# Patient Record
Sex: FEMALE | Race: WHITE | NOT HISPANIC OR LATINO | Employment: STUDENT | URBAN - NONMETROPOLITAN AREA
[De-identification: names, ages, dates, MRNs, and addresses within clinical notes are randomized per-mention and may not be internally consistent; named-entity substitution may affect disease eponyms.]

---

## 2018-03-28 ENCOUNTER — HOSPITAL ENCOUNTER (EMERGENCY)
Facility: HOSPITAL | Age: 23
End: 2018-03-29
Attending: EMERGENCY MEDICINE | Admitting: EMERGENCY MEDICINE
Payer: COMMERCIAL

## 2018-03-28 ENCOUNTER — APPOINTMENT (EMERGENCY)
Dept: CT IMAGING | Facility: HOSPITAL | Age: 23
End: 2018-03-28
Payer: COMMERCIAL

## 2018-03-28 DIAGNOSIS — A41.9 SEPSIS DUE TO URINARY TRACT INFECTION (HCC): ICD-10-CM

## 2018-03-28 DIAGNOSIS — N20.1 URETEROLITHIASIS: ICD-10-CM

## 2018-03-28 DIAGNOSIS — N12 PYELONEPHRITIS: Primary | ICD-10-CM

## 2018-03-28 DIAGNOSIS — N39.0 SEPSIS DUE TO URINARY TRACT INFECTION (HCC): ICD-10-CM

## 2018-03-28 DIAGNOSIS — N17.9 ACUTE KIDNEY INJURY (HCC): ICD-10-CM

## 2018-03-28 LAB
ALBUMIN SERPL BCP-MCNC: 3.6 G/DL (ref 3.5–5)
ALP SERPL-CCNC: 65 U/L (ref 46–116)
ALT SERPL W P-5'-P-CCNC: 23 U/L (ref 12–78)
ANION GAP SERPL CALCULATED.3IONS-SCNC: 14 MMOL/L (ref 4–13)
APTT PPP: 37 SECONDS (ref 23–35)
AST SERPL W P-5'-P-CCNC: 16 U/L (ref 5–45)
BACTERIA UR QL AUTO: ABNORMAL /HPF
BASOPHILS # BLD MANUAL: 0 THOUSAND/UL (ref 0–0.1)
BASOPHILS NFR MAR MANUAL: 0 % (ref 0–1)
BILIRUB SERPL-MCNC: 0.6 MG/DL (ref 0.2–1)
BILIRUB UR QL STRIP: NEGATIVE
BUN SERPL-MCNC: 13 MG/DL (ref 5–25)
CALCIUM SERPL-MCNC: 8.8 MG/DL (ref 8.3–10.1)
CHLORIDE SERPL-SCNC: 98 MMOL/L (ref 100–108)
CLARITY UR: ABNORMAL
CO2 SERPL-SCNC: 20 MMOL/L (ref 21–32)
COLOR UR: YELLOW
CREAT SERPL-MCNC: 1.43 MG/DL (ref 0.6–1.3)
EOSINOPHIL # BLD MANUAL: 0 THOUSAND/UL (ref 0–0.4)
EOSINOPHIL NFR BLD MANUAL: 0 % (ref 0–6)
ERYTHROCYTE [DISTWIDTH] IN BLOOD BY AUTOMATED COUNT: 12.5 % (ref 11.6–15.1)
EXT PREG TEST URINE: NEGATIVE
GFR SERPL CREATININE-BSD FRML MDRD: 53 ML/MIN/1.73SQ M
GLUCOSE SERPL-MCNC: 147 MG/DL (ref 65–140)
GLUCOSE UR STRIP-MCNC: NEGATIVE MG/DL
HCT VFR BLD AUTO: 35.6 % (ref 34.8–46.1)
HGB BLD-MCNC: 12.3 G/DL (ref 11.5–15.4)
HGB UR QL STRIP.AUTO: ABNORMAL
INR PPP: 1.27 (ref 0.86–1.16)
KETONES UR STRIP-MCNC: ABNORMAL MG/DL
LACTATE SERPL-SCNC: 2.2 MMOL/L (ref 0.5–2)
LEUKOCYTE ESTERASE UR QL STRIP: ABNORMAL
LYMPHOCYTES # BLD AUTO: 0.7 THOUSAND/UL (ref 0.6–4.47)
LYMPHOCYTES # BLD AUTO: 4 % (ref 14–44)
MCH RBC QN AUTO: 30.4 PG (ref 26.8–34.3)
MCHC RBC AUTO-ENTMCNC: 34.6 G/DL (ref 31.4–37.4)
MCV RBC AUTO: 88 FL (ref 82–98)
MONOCYTES # BLD AUTO: 1.23 THOUSAND/UL (ref 0–1.22)
MONOCYTES NFR BLD: 7 % (ref 4–12)
NEUTROPHILS # BLD MANUAL: 15.58 THOUSAND/UL (ref 1.85–7.62)
NEUTS BAND NFR BLD MANUAL: 2 % (ref 0–8)
NEUTS SEG NFR BLD AUTO: 87 % (ref 43–75)
NITRITE UR QL STRIP: NEGATIVE
NON-SQ EPI CELLS URNS QL MICRO: ABNORMAL /HPF
PH UR STRIP.AUTO: 6 [PH] (ref 4.5–8)
PLATELET # BLD AUTO: 211 THOUSANDS/UL (ref 149–390)
PLATELET BLD QL SMEAR: ADEQUATE
PMV BLD AUTO: 9.7 FL (ref 8.9–12.7)
POTASSIUM SERPL-SCNC: 3.6 MMOL/L (ref 3.5–5.3)
PROT SERPL-MCNC: 7.7 G/DL (ref 6.4–8.2)
PROT UR STRIP-MCNC: ABNORMAL MG/DL
PROTHROMBIN TIME: 15.8 SECONDS (ref 12.1–14.4)
RBC # BLD AUTO: 4.04 MILLION/UL (ref 3.81–5.12)
RBC #/AREA URNS AUTO: ABNORMAL /HPF
SODIUM SERPL-SCNC: 132 MMOL/L (ref 136–145)
SP GR UR STRIP.AUTO: 1.02 (ref 1–1.03)
TOTAL CELLS COUNTED SPEC: 100
UROBILINOGEN UR QL STRIP.AUTO: 0.2 E.U./DL
WBC # BLD AUTO: 17.51 THOUSAND/UL (ref 4.31–10.16)
WBC #/AREA URNS AUTO: ABNORMAL /HPF

## 2018-03-28 PROCEDURE — 80053 COMPREHEN METABOLIC PANEL: CPT | Performed by: EMERGENCY MEDICINE

## 2018-03-28 PROCEDURE — 85007 BL SMEAR W/DIFF WBC COUNT: CPT | Performed by: EMERGENCY MEDICINE

## 2018-03-28 PROCEDURE — 81001 URINALYSIS AUTO W/SCOPE: CPT | Performed by: EMERGENCY MEDICINE

## 2018-03-28 PROCEDURE — 85730 THROMBOPLASTIN TIME PARTIAL: CPT | Performed by: EMERGENCY MEDICINE

## 2018-03-28 PROCEDURE — 83605 ASSAY OF LACTIC ACID: CPT | Performed by: EMERGENCY MEDICINE

## 2018-03-28 PROCEDURE — 84703 CHORIONIC GONADOTROPIN ASSAY: CPT | Performed by: EMERGENCY MEDICINE

## 2018-03-28 PROCEDURE — 87040 BLOOD CULTURE FOR BACTERIA: CPT | Performed by: EMERGENCY MEDICINE

## 2018-03-28 PROCEDURE — 81025 URINE PREGNANCY TEST: CPT | Performed by: EMERGENCY MEDICINE

## 2018-03-28 PROCEDURE — 85610 PROTHROMBIN TIME: CPT | Performed by: EMERGENCY MEDICINE

## 2018-03-28 PROCEDURE — 87186 SC STD MICRODIL/AGAR DIL: CPT | Performed by: EMERGENCY MEDICINE

## 2018-03-28 PROCEDURE — 87086 URINE CULTURE/COLONY COUNT: CPT | Performed by: EMERGENCY MEDICINE

## 2018-03-28 PROCEDURE — 87077 CULTURE AEROBIC IDENTIFY: CPT | Performed by: EMERGENCY MEDICINE

## 2018-03-28 PROCEDURE — 36415 COLL VENOUS BLD VENIPUNCTURE: CPT

## 2018-03-28 PROCEDURE — 85027 COMPLETE CBC AUTOMATED: CPT | Performed by: EMERGENCY MEDICINE

## 2018-03-28 PROCEDURE — 74177 CT ABD & PELVIS W/CONTRAST: CPT

## 2018-03-28 PROCEDURE — 96375 TX/PRO/DX INJ NEW DRUG ADDON: CPT

## 2018-03-28 PROCEDURE — 96361 HYDRATE IV INFUSION ADD-ON: CPT

## 2018-03-28 RX ORDER — ACETAMINOPHEN 325 MG/1
975 TABLET ORAL ONCE
Status: COMPLETED | OUTPATIENT
Start: 2018-03-28 | End: 2018-03-28

## 2018-03-28 RX ORDER — ONDANSETRON 2 MG/ML
4 INJECTION INTRAMUSCULAR; INTRAVENOUS ONCE
Status: COMPLETED | OUTPATIENT
Start: 2018-03-28 | End: 2018-03-28

## 2018-03-28 RX ADMIN — SODIUM CHLORIDE 1000 ML: 0.9 INJECTION, SOLUTION INTRAVENOUS at 22:26

## 2018-03-28 RX ADMIN — ACETAMINOPHEN 975 MG: 325 TABLET, FILM COATED ORAL at 23:07

## 2018-03-28 RX ADMIN — ONDANSETRON 4 MG: 2 INJECTION INTRAMUSCULAR; INTRAVENOUS at 23:07

## 2018-03-28 RX ADMIN — SODIUM CHLORIDE 1000 ML: 0.9 INJECTION, SOLUTION INTRAVENOUS at 23:07

## 2018-03-28 RX ADMIN — IOHEXOL 100 ML: 350 INJECTION, SOLUTION INTRAVENOUS at 23:52

## 2018-03-29 ENCOUNTER — ANESTHESIA EVENT (INPATIENT)
Dept: PERIOP | Facility: HOSPITAL | Age: 23
DRG: 872 | End: 2018-03-29
Payer: COMMERCIAL

## 2018-03-29 ENCOUNTER — HOSPITAL ENCOUNTER (INPATIENT)
Facility: HOSPITAL | Age: 23
LOS: 3 days | Discharge: HOME | DRG: 872 | End: 2018-04-01
Attending: INTERNAL MEDICINE | Admitting: INTERNAL MEDICINE
Payer: COMMERCIAL

## 2018-03-29 ENCOUNTER — APPOINTMENT (INPATIENT)
Dept: RADIOLOGY | Facility: HOSPITAL | Age: 23
DRG: 872 | End: 2018-03-29
Payer: COMMERCIAL

## 2018-03-29 ENCOUNTER — ANESTHESIA (INPATIENT)
Dept: PERIOP | Facility: HOSPITAL | Age: 23
DRG: 872 | End: 2018-03-29
Payer: COMMERCIAL

## 2018-03-29 VITALS
DIASTOLIC BLOOD PRESSURE: 61 MMHG | TEMPERATURE: 100 F | HEART RATE: 94 BPM | WEIGHT: 150 LBS | HEIGHT: 67 IN | RESPIRATION RATE: 19 BRPM | OXYGEN SATURATION: 100 % | SYSTOLIC BLOOD PRESSURE: 114 MMHG | BODY MASS INDEX: 23.54 KG/M2

## 2018-03-29 DIAGNOSIS — N10 ACUTE PYELONEPHRITIS: ICD-10-CM

## 2018-03-29 DIAGNOSIS — N20.1 URETERAL STONE: ICD-10-CM

## 2018-03-29 DIAGNOSIS — N12 PYELONEPHRITIS: Primary | ICD-10-CM

## 2018-03-29 PROBLEM — A41.9 SEPSIS (HCC): Status: ACTIVE | Noted: 2018-03-29

## 2018-03-29 LAB
ANION GAP SERPL CALCULATED.3IONS-SCNC: 13 MMOL/L (ref 4–13)
BUN SERPL-MCNC: 13 MG/DL (ref 5–25)
CALCIUM SERPL-MCNC: 8 MG/DL (ref 8.3–10.1)
CHLORIDE SERPL-SCNC: 105 MMOL/L (ref 100–108)
CO2 SERPL-SCNC: 19 MMOL/L (ref 21–32)
CREAT SERPL-MCNC: 1.32 MG/DL (ref 0.6–1.3)
ERYTHROCYTE [DISTWIDTH] IN BLOOD BY AUTOMATED COUNT: 13 % (ref 11.6–15.1)
GFR SERPL CREATININE-BSD FRML MDRD: 58 ML/MIN/1.73SQ M
GLUCOSE SERPL-MCNC: 116 MG/DL (ref 65–140)
HCG SERPL QL: NEGATIVE
HCG SERPL QL: NEGATIVE
HCT VFR BLD AUTO: 30.5 % (ref 34.8–46.1)
HGB BLD-MCNC: 10.4 G/DL (ref 11.5–15.4)
HOLD SPECIMEN: NORMAL
LACTATE SERPL-SCNC: 1.3 MMOL/L (ref 0.5–2)
MCH RBC QN AUTO: 30 PG (ref 26.8–34.3)
MCHC RBC AUTO-ENTMCNC: 34.1 G/DL (ref 31.4–37.4)
MCV RBC AUTO: 88 FL (ref 82–98)
PLATELET # BLD AUTO: 176 THOUSANDS/UL (ref 149–390)
PMV BLD AUTO: 10 FL (ref 8.9–12.7)
POTASSIUM SERPL-SCNC: 3.5 MMOL/L (ref 3.5–5.3)
RBC # BLD AUTO: 3.47 MILLION/UL (ref 3.81–5.12)
SODIUM SERPL-SCNC: 137 MMOL/L (ref 136–145)
WBC # BLD AUTO: 12.2 THOUSAND/UL (ref 4.31–10.16)

## 2018-03-29 PROCEDURE — 99285 EMERGENCY DEPT VISIT HI MDM: CPT

## 2018-03-29 PROCEDURE — C2625 STENT, NON-COR, TEM W/DEL SY: HCPCS | Performed by: UROLOGY

## 2018-03-29 PROCEDURE — 99223 1ST HOSP IP/OBS HIGH 75: CPT | Performed by: INTERNAL MEDICINE

## 2018-03-29 PROCEDURE — 74450 X-RAY URETHRA/BLADDER: CPT

## 2018-03-29 PROCEDURE — 87040 BLOOD CULTURE FOR BACTERIA: CPT | Performed by: INTERNAL MEDICINE

## 2018-03-29 PROCEDURE — 85027 COMPLETE CBC AUTOMATED: CPT | Performed by: INTERNAL MEDICINE

## 2018-03-29 PROCEDURE — 96365 THER/PROPH/DIAG IV INF INIT: CPT

## 2018-03-29 PROCEDURE — 36415 COLL VENOUS BLD VENIPUNCTURE: CPT | Performed by: EMERGENCY MEDICINE

## 2018-03-29 PROCEDURE — 80048 BASIC METABOLIC PNL TOTAL CA: CPT | Performed by: INTERNAL MEDICINE

## 2018-03-29 PROCEDURE — 83605 ASSAY OF LACTIC ACID: CPT | Performed by: EMERGENCY MEDICINE

## 2018-03-29 PROCEDURE — 96361 HYDRATE IV INFUSION ADD-ON: CPT

## 2018-03-29 PROCEDURE — BT1FYZZ FLUOROSCOPY OF LEFT KIDNEY, URETER AND BLADDER USING OTHER CONTRAST: ICD-10-PCS | Performed by: UROLOGY

## 2018-03-29 PROCEDURE — 0T778DZ DILATION OF LEFT URETER WITH INTRALUMINAL DEVICE, VIA NATURAL OR ARTIFICIAL OPENING ENDOSCOPIC: ICD-10-PCS | Performed by: UROLOGY

## 2018-03-29 PROCEDURE — 96375 TX/PRO/DX INJ NEW DRUG ADDON: CPT

## 2018-03-29 PROCEDURE — 99254 IP/OBS CNSLTJ NEW/EST MOD 60: CPT | Performed by: INTERNAL MEDICINE

## 2018-03-29 PROCEDURE — 52332 CYSTOSCOPY AND TREATMENT: CPT | Performed by: UROLOGY

## 2018-03-29 PROCEDURE — 84703 CHORIONIC GONADOTROPIN ASSAY: CPT | Performed by: INTERNAL MEDICINE

## 2018-03-29 PROCEDURE — 99254 IP/OBS CNSLTJ NEW/EST MOD 60: CPT | Performed by: PHYSICIAN ASSISTANT

## 2018-03-29 DEVICE — STENT KWART RETRO INJECT SET 6FR 145CM: Type: IMPLANTABLE DEVICE | Site: URETER | Status: FUNCTIONAL

## 2018-03-29 RX ORDER — OXYCODONE HYDROCHLORIDE AND ACETAMINOPHEN 5; 325 MG/1; MG/1
1 TABLET ORAL EVERY 4 HOURS PRN
Status: DISCONTINUED | OUTPATIENT
Start: 2018-03-29 | End: 2018-04-01 | Stop reason: HOSPADM

## 2018-03-29 RX ORDER — SODIUM CHLORIDE, SODIUM LACTATE, POTASSIUM CHLORIDE, CALCIUM CHLORIDE 600; 310; 30; 20 MG/100ML; MG/100ML; MG/100ML; MG/100ML
INJECTION, SOLUTION INTRAVENOUS CONTINUOUS PRN
Status: DISCONTINUED | OUTPATIENT
Start: 2018-03-29 | End: 2018-03-29 | Stop reason: SURG

## 2018-03-29 RX ORDER — PROPOFOL 10 MG/ML
INJECTION, EMULSION INTRAVENOUS AS NEEDED
Status: DISCONTINUED | OUTPATIENT
Start: 2018-03-29 | End: 2018-03-29 | Stop reason: SURG

## 2018-03-29 RX ORDER — POLYETHYLENE GLYCOL 3350 17 G/17G
17 POWDER, FOR SOLUTION ORAL DAILY
Status: DISCONTINUED | OUTPATIENT
Start: 2018-03-29 | End: 2018-03-29

## 2018-03-29 RX ORDER — ONDANSETRON 2 MG/ML
4 INJECTION INTRAMUSCULAR; INTRAVENOUS EVERY 4 HOURS PRN
Status: DISCONTINUED | OUTPATIENT
Start: 2018-03-29 | End: 2018-04-01 | Stop reason: HOSPADM

## 2018-03-29 RX ORDER — ACETAMINOPHEN 325 MG/1
650 TABLET ORAL EVERY 6 HOURS PRN
Status: DISCONTINUED | OUTPATIENT
Start: 2018-03-29 | End: 2018-03-29

## 2018-03-29 RX ORDER — ZOLPIDEM TARTRATE 5 MG/1
5 TABLET ORAL
Status: DISCONTINUED | OUTPATIENT
Start: 2018-03-29 | End: 2018-04-01 | Stop reason: HOSPADM

## 2018-03-29 RX ORDER — FENTANYL CITRATE 50 UG/ML
INJECTION, SOLUTION INTRAMUSCULAR; INTRAVENOUS AS NEEDED
Status: DISCONTINUED | OUTPATIENT
Start: 2018-03-29 | End: 2018-03-29 | Stop reason: SURG

## 2018-03-29 RX ORDER — MAGNESIUM HYDROXIDE 1200 MG/15ML
LIQUID ORAL AS NEEDED
Status: DISCONTINUED | OUTPATIENT
Start: 2018-03-29 | End: 2018-03-29 | Stop reason: HOSPADM

## 2018-03-29 RX ORDER — MIDAZOLAM HYDROCHLORIDE 1 MG/ML
INJECTION INTRAMUSCULAR; INTRAVENOUS AS NEEDED
Status: DISCONTINUED | OUTPATIENT
Start: 2018-03-29 | End: 2018-03-29 | Stop reason: SURG

## 2018-03-29 RX ORDER — FENTANYL CITRATE/PF 50 MCG/ML
25 SYRINGE (ML) INJECTION
Status: DISCONTINUED | OUTPATIENT
Start: 2018-03-29 | End: 2018-03-29 | Stop reason: HOSPADM

## 2018-03-29 RX ORDER — FENTANYL CITRATE 50 UG/ML
50 INJECTION, SOLUTION INTRAMUSCULAR; INTRAVENOUS ONCE
Status: COMPLETED | OUTPATIENT
Start: 2018-03-29 | End: 2018-03-29

## 2018-03-29 RX ORDER — SODIUM CHLORIDE 9 MG/ML
INJECTION, SOLUTION INTRAVENOUS CONTINUOUS PRN
Status: DISCONTINUED | OUTPATIENT
Start: 2018-03-29 | End: 2018-03-29 | Stop reason: SURG

## 2018-03-29 RX ORDER — ONDANSETRON 2 MG/ML
4 INJECTION INTRAMUSCULAR; INTRAVENOUS ONCE
Status: DISCONTINUED | OUTPATIENT
Start: 2018-03-29 | End: 2018-03-29 | Stop reason: HOSPADM

## 2018-03-29 RX ORDER — HEPARIN SODIUM 5000 [USP'U]/ML
5000 INJECTION, SOLUTION INTRAVENOUS; SUBCUTANEOUS EVERY 8 HOURS SCHEDULED
Status: DISCONTINUED | OUTPATIENT
Start: 2018-03-29 | End: 2018-04-01 | Stop reason: HOSPADM

## 2018-03-29 RX ORDER — SODIUM CHLORIDE 9 MG/ML
125 INJECTION, SOLUTION INTRAVENOUS CONTINUOUS
Status: DISCONTINUED | OUTPATIENT
Start: 2018-03-29 | End: 2018-03-30

## 2018-03-29 RX ORDER — LIDOCAINE HYDROCHLORIDE 10 MG/ML
INJECTION, SOLUTION INFILTRATION; PERINEURAL AS NEEDED
Status: DISCONTINUED | OUTPATIENT
Start: 2018-03-29 | End: 2018-03-29 | Stop reason: SURG

## 2018-03-29 RX ORDER — ACETAMINOPHEN 325 MG/1
650 TABLET ORAL EVERY 4 HOURS PRN
Status: DISCONTINUED | OUTPATIENT
Start: 2018-03-29 | End: 2018-04-01 | Stop reason: HOSPADM

## 2018-03-29 RX ADMIN — CEFTRIAXONE 1000 MG: 1 INJECTION, SOLUTION INTRAVENOUS at 05:57

## 2018-03-29 RX ADMIN — MIDAZOLAM HYDROCHLORIDE 2 MG: 1 INJECTION, SOLUTION INTRAMUSCULAR; INTRAVENOUS at 11:18

## 2018-03-29 RX ADMIN — SODIUM CHLORIDE, SODIUM LACTATE, POTASSIUM CHLORIDE, AND CALCIUM CHLORIDE: .6; .31; .03; .02 INJECTION, SOLUTION INTRAVENOUS at 12:04

## 2018-03-29 RX ADMIN — ONDANSETRON HYDROCHLORIDE 8 MG: 2 INJECTION, SOLUTION INTRAVENOUS at 11:33

## 2018-03-29 RX ADMIN — FENTANYL CITRATE 25 MCG: 50 INJECTION, SOLUTION INTRAMUSCULAR; INTRAVENOUS at 13:53

## 2018-03-29 RX ADMIN — HEPARIN SODIUM 5000 UNITS: 5000 INJECTION, SOLUTION INTRAVENOUS; SUBCUTANEOUS at 21:43

## 2018-03-29 RX ADMIN — PROPOFOL 200 MG: 10 INJECTION, EMULSION INTRAVENOUS at 11:23

## 2018-03-29 RX ADMIN — PIPERACILLIN SODIUM AND TAZOBACTAM SODIUM: 4; .5 INJECTION, POWDER, FOR SOLUTION INTRAVENOUS at 00:02

## 2018-03-29 RX ADMIN — SODIUM CHLORIDE: 0.9 INJECTION, SOLUTION INTRAVENOUS at 11:14

## 2018-03-29 RX ADMIN — HYDROMORPHONE HYDROCHLORIDE 1 MG: 1 INJECTION, SOLUTION INTRAMUSCULAR; INTRAVENOUS; SUBCUTANEOUS at 04:57

## 2018-03-29 RX ADMIN — SODIUM CHLORIDE 125 ML/HR: 0.9 INJECTION, SOLUTION INTRAVENOUS at 05:03

## 2018-03-29 RX ADMIN — FENTANYL CITRATE 50 MCG: 50 INJECTION INTRAMUSCULAR; INTRAVENOUS at 01:47

## 2018-03-29 RX ADMIN — SODIUM CHLORIDE 500 ML: 0.9 INJECTION, SOLUTION INTRAVENOUS at 02:33

## 2018-03-29 RX ADMIN — DEXAMETHASONE SODIUM PHOSPHATE 8 MG: 10 INJECTION INTRAMUSCULAR; INTRAVENOUS at 11:33

## 2018-03-29 RX ADMIN — SODIUM CHLORIDE 125 ML/HR: 0.9 INJECTION, SOLUTION INTRAVENOUS at 13:31

## 2018-03-29 RX ADMIN — FENTANYL CITRATE 50 MCG: 50 INJECTION, SOLUTION INTRAMUSCULAR; INTRAVENOUS at 11:37

## 2018-03-29 RX ADMIN — LIDOCAINE HYDROCHLORIDE 60 MG: 10 INJECTION, SOLUTION INFILTRATION; PERINEURAL at 11:23

## 2018-03-29 RX ADMIN — OXYCODONE HYDROCHLORIDE AND ACETAMINOPHEN 1 TABLET: 5; 325 TABLET ORAL at 15:23

## 2018-03-29 RX ADMIN — ACETAMINOPHEN 650 MG: 325 TABLET, FILM COATED ORAL at 05:00

## 2018-03-29 RX ADMIN — FENTANYL CITRATE 100 MCG: 50 INJECTION, SOLUTION INTRAMUSCULAR; INTRAVENOUS at 11:23

## 2018-03-29 RX ADMIN — SODIUM CHLORIDE, SODIUM LACTATE, POTASSIUM CHLORIDE, AND CALCIUM CHLORIDE: .6; .31; .03; .02 INJECTION, SOLUTION INTRAVENOUS at 11:34

## 2018-03-29 RX ADMIN — OXYCODONE HYDROCHLORIDE AND ACETAMINOPHEN 1 TABLET: 5; 325 TABLET ORAL at 20:33

## 2018-03-29 RX ADMIN — FENTANYL CITRATE 50 MCG: 50 INJECTION, SOLUTION INTRAMUSCULAR; INTRAVENOUS at 11:45

## 2018-03-29 NOTE — SOCIAL WORK
CM met with patient at bedside to address discharge needs, however, patient was ALIVIA at [address] pool  Patients parents were present and answered CMs opening questions  Patient lives at college in [place] right now, but when she is not at college she lives with her parents  Patients bedroom is on the 3rd floor  Patient has no difficulty with steps  Patient is independent with ADLs and functional mobility  Support system is her parents  Food shopping & meal prep is done by her mother  Patient does not use any DMEs  Patient is able to ambulate without  assistance  No hx of VNA reported  Patient is not currently employed as she is a full-time student in [place]  PCP identified as  Dr Jez Alexis  No POA identified, but parents would be patients healthcare representative, spokesperson, and designated caregiver if needed  Patient uses [facility] or [facility] for Rx needs; patients parents made aware of [address] to use at discharge if needed  Patient does drive; parents will be transporting  home upon discharge  Help/support reported  Patient made aware of CM's name, number and role  CM to follow as case progresses and needs are determined

## 2018-03-29 NOTE — CONSULTS
Consultation - Infectious Disease   Evette Davis 23 y o  female MRN: [id number]  Unit/Bed#: MATTHIEU LOYA Encounter: [id number]      IMPRESSION & RECOMMENDATIONS:   Impression/Recommendations: This is a 23 y o  female, otherwise healthy, presented with fever and left flank pain  Patient was found to have sepsis and an obstructing left ureteral stone  She is scheduled to undergo left ureteral stent placement  1   Sepsis, POA, presented with fever, leukocytosis and tachycardia  Source of sepsis is most likely UTI  Given lack of hospitalization antibiotic use, patient has low risk for resistant pathogens  She has been started on IV ceftriaxone  This is reasonable  Despite sepsis, patient is systemically well, without hypotension of toxicity  Antibiotic plan as in below  Monitor temperature/WBC  Monitor hemodynamics  Follow-up on pending blood cultures  2   Obstructive left pyelonephritis, secondary to left ureteral stone  Patient is clinically stable  She is to undergo left ureteral stent placement later today  Continue IV ceftriaxone  Follow-up on blood and urine cultures and adjust antibiotic accordingly  Plan for left ureteral stent placement later today noted  3   Nephrolithiasis  This is unknown to patient  Urology evaluation noted  She will eventually need stone extraction, once pyelonephritis and sepsis resolved  Urology follow-up for stone extraction after pyelonephritis and sepsis resolve  4   REHAN, POA  This is probably a combination of sepsis and left ureteral obstruction  Creatinine is improved overnight  Ceftriaxone at full dose  Monitor creatinine  5   Multi loculated left ovarian cyst   This has incidental finding  Symptoms above are due to obstructive left pyelonephritis and not the versus  This can be worked up electively  [facility] ER note reviewed in detail  Discussed with patient in detail regarding the above plan      Thank you for this consultation  We will follow along with you  HISTORY OF PRESENT ILLNESS:  Reason for Consult:  Sepsis  Pyelonephritis  HPI: Mirlande Perez is a 23 y o  female, otherwise healthy, presented to [address] [place] [address] ER last night with fever and left flank pain  On presentation, patient had fever, leukocytosis and tachycardia  Abdomen/pelvis CT showed an obstructing left ureteral stone  IV ceftriaxone was started  Patient was transferred here for Urology/IR evaluation  She is scheduled to undergo placement of left ureteral stent later today  For all these reasons, we are asked to evaluate the patient  Patient denies any prior UTIs  She denies history of nephrolithiasis  No family history of nephrolithiasis  Patient states that she started having left flank pain 3 days prior to presentation  Pain slowly became worse, with radiation to left hip/pelvis  In addition, she started having fever/chills with nausea/vomiting  No diarrhea  No recent antibiotic  No urinary symptoms  REVIEW OF SYSTEMS:  A complete 12 point system-based review of systems is otherwise negative  PAST MEDICAL HISTORY:  History reviewed  No pertinent past medical history  History reviewed  No pertinent surgical history  Problem list reviewed  FAMILY HISTORY:  Non-contributory    SOCIAL HISTORY:  History   Alcohol Use    Yes     Comment: occasionally     History   Drug Use No     History   Smoking Status    Never Smoker   Smokeless Tobacco    Never Used       ALLERGIES:  Allergies   Allergen Reactions    Shellfish-Derived Products Hives       MEDICATIONS:  All current active medications have been reviewed  Patient is currently on IV ceftriaxone      PHYSICAL EXAM:  Vitals:  HR:  [] 106  Resp:  [12-19] 18  BP: (114-140)/(61-86) 132/63  SpO2:  [98 %-100 %] 98 %  Temp (24hrs), Av 2 °F (38 4 °C), Min:100 °F (37 8 °C), Max:102 5 °F (39 2 °C)  Current: Temperature: 100 °F (37 8 °C)     Physical Exam:  General: Well-nourished, well-developed, in no acute distress  Awake, alert and oriented x 3  Eyes:  Conjunctive clear with no hemorrhages or effusions  Oropharynx:  No ulcers, no lesions, pharynx benign, no tonsillitis  Neck:  Supple, no lymphadenopathy, no mass, nontender  Lungs:  Expansion symmetric, no rales, no wheezing, no accessory muscle use  Cardiac:  Tachycardic with regular rhythm, normal S1, normal S2, no murmurs  Abdomen:  Soft, nondistended, moderate left flank and CVA tenderness, no HSM  Extremities:  No edema, no erythema, nontender  No ulcers  Skin:  No rashes, no ulcers  Neurological:  Moves all four extremities spontaneously, sensation grossly intact    LABS, IMAGING, & OTHER STUDIES:  Lab Results:  I have personally reviewed pertinent labs  Results from last 7 days  Lab Units 03/29/18  0550 03/28/18  2225   SODIUM mmol/L 137 132*   POTASSIUM mmol/L 3 5 3 6   CHLORIDE mmol/L 105 98*   CO2 mmol/L 19* 20*   ANION GAP mmol/L 13 14*   BUN mg/dL 13 13   CREATININE mg/dL 1 32* 1 43*   EGFR ml/min/1 73sq m 58 53   GLUCOSE RANDOM mg/dL 116 147*   CALCIUM mg/dL 8 0* 8 8   AST U/L  --  16   ALT U/L  --  23   ALK PHOS U/L  --  65   TOTAL PROTEIN g/dL  --  7 7   BILIRUBIN TOTAL mg/dL  --  0 60       Results from last 7 days  Lab Units 03/29/18  0550 03/28/18  2225   WBC Thousand/uL 12 20* 17 51*   HEMOGLOBIN g/dL 10 4* 12 3   PLATELETS Thousands/uL 176 211           Imaging Studies:   I have personally reviewed pertinent imaging study reports and images in PACS  Abdomen/pelvis CT reviewed personally  There is an obstructing left ureteral stone with left hydronephrosis and hydroureter  There are also other nonobstructing left-sided and right-sided stones  There is a multiloculated left ovarian cyst     EKG, Pathology, and Other Studies:   I have personally reviewed pertinent reports

## 2018-03-29 NOTE — TREATMENT PLAN
Reviewed Chart  Pt admitted by MD this am  Pt is on antibiotics  Scheduled for a stent now and not nephrostomy tube  Agree with plan except would add REHAN  Would limit nephrotoxic medications  Continue IVF   Check bmp in am

## 2018-03-29 NOTE — EMTALA/ACUTE CARE TRANSFER
[address] [facility] [address]  [address] [facility] [address]  Dept: [phone]      EMTALA TRANSFER CONSENT    NAME Anitra Martin                                         1995                              MRN [id number]    I have been informed of my rights regarding examination, treatment, and transfer   by Dr Myriam Wilson DO    Benefits: Specialized equipment and/or services available at the receiving facility (Include comment)________________________ (IR)    Risks: Potential for delay in receiving treatment, Other: (Include comment)__________________________, Potential deterioration of medical condition, Loss of IV, Increased discomfort during transfer, Possible worsening of condition or death during transfer (motor vehicle collision)      Consent for Transfer:  I acknowledge that my medical condition has been evaluated and explained to me by the emergency department physician or other qualified medical person and/or my attending physician, who has recommended that I be transferred to the service of  Accepting Physician: Dr Ernesto Bourne at [address] [place] [address] Name, [facility] : [address] [place] [address]  The above potential benefits of such transfer, the potential risks associated with such transfer, and the probable risks of not being transferred have been explained to me, and I fully understand them  The doctor has explained that, in my case, the benefits of transfer outweigh the risks  I agree to be transferred  I authorize the performance of emergency medical procedures and treatments upon me in both transit and upon arrival at the receiving facility  Additionally, I authorize the release of any and all medical records to the receiving facility and request they be transported with me, if possible  I understand that the safest mode of transportation during a medical emergency is an ambulance and that the Hospital advocates the use of this mode of transport   Risks of traveling to the receiving facility by car, including absence of medical control, life sustaining equipment, such as oxygen, and medical personnel has been explained to me and I fully understand them  (MANINDER CORRECT BOX BELOW)  [  ]  I consent to the stated transfer and to be transported by ambulance/helicopter  [  ]  I consent to the stated transfer, but refuse transportation by ambulance and accept full responsibility for my transportation by car  I understand the risks of non-ambulance transfers and I exonerate the Hospital and its staff from any deterioration in my condition that results from this refusal     X___________________________________________    DATE  18  TIME________  Signature of patient or legally responsible individual signing on patient behalf           RELATIONSHIP TO PATIENT_________________________          Provider Certification    NAME Matteo Ferrer                                         1995                              MRN [id number]    A medical screening exam was performed on the above named patient  Based on the examination:    Condition Necessitating Transfer The primary encounter diagnosis was Pyelonephritis  Diagnoses of Sepsis due to [facility] tract infection ([facility] ), Ureterolithiasis, and Acute kidney injury [facility]) were also pertinent to this visit      Patient Condition: The patient has been stabilized such that within reasonable medical probability, no material deterioration of the patient condition or the condition of the unborn child(iveth) is likely to result from the transfer    Reason for Transfer: Level of Care needed not available at this facility (IR)    Transfer Requirements: [address]   · Space available and qualified personnel available for treatment as acknowledged by    · Agreed to accept transfer and to provide appropriate medical treatment as acknowledged by       Dr Felicia Marlow  · Appropriate medical records of the examination and treatment of the patient are provided at the time of transfer   [address] [facility] _______  · Transfer will be performed by qualified personnel from    and appropriate transfer equipment as required, including the use of necessary and appropriate life support measures  Provider Certification: I have examined the patient and explained the following risks and benefits of being transferred/refusing transfer to the patient/family:  General risk, such as traffic hazards, adverse weather conditions, rough terrain or turbulence, possible failure of equipment (including vehicle or aircraft), or consequences of actions of persons outside the control of the transport personnel      Based on these reasonable risks and benefits to the patient and/or the unborn child(iveth), and based upon the information available at the time of the patients examination, I certify that the medical benefits reasonably to be expected from the provision of appropriate medical treatments at another medical facility outweigh the increasing risks, if any, to the individuals medical condition, and in the case of labor to the unborn child, from effecting the transfer      X____________________________________________ DATE 03/29/18        TIME_______      ORIGINAL - SEND TO MEDICAL RECORDS   COPY - SEND WITH PATIENT DURING TRANSFER

## 2018-03-29 NOTE — ANESTHESIA POSTPROCEDURE EVALUATION
Post-Op Assessment Note      CV Status:  Stable    Mental Status:  Alert and awake    Hydration Status:  Euvolemic    PONV Controlled:  Controlled    Airway Patency:  Patent  Airway: intubated    Post Op Vitals Reviewed: Yes          Staff: Anesthesiologist       Comments: Patient has significant hypoxemia in PACU  SPO2 on room ait as low as 83%  On examination her lungs were clear with no crackles or wheezing  SPO2 increaesd to 92% on O2 via nasal cannula at 6 L/min  Encouraged to aggresively  use the incentive spirometer while on the medical floor    This was discussed with Dr Maureen Stearns          BP      Temp     Pulse     Resp      SpO2

## 2018-03-29 NOTE — PROGRESS NOTES
Vascular and Interventional Radiology brief note    Consult order was seen this morning and Interventional Radiology was planning for placement of left percutaneous nephrostomy tube at the request of the Urology service  We had contacted anesthesia and were scheduling the patient for a 10 a m  procedure  Unfortunately, the angio suite was being used for an angiogram by the vascular surgery service  Dr Belem Rivera has 2 additional angiograms scheduled in the room  However, he was very accommodating and agreed to delay his 2nd case so that this patient could receive the left percutaneous nephrostomy tube  It is now my understanding that the patient is being taken to the OR for retrograde stent placement by the Urology service  The consult has been canceled by the urology service and the patient is no longer on our schedule  We remain available to assist as needed
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
## 2018-03-29 NOTE — PROGRESS NOTES
UROLOGY PROGRESS NOTE   Patient Identifiers: Yajaira Pyle (MRN [id number])  Date of Service: 3/29/2018    Subjective:     24 HR EVENTS:  Febrile with a temperature of 102 5  Pulse 113  WBC 12 20  Cr pending  Pain well controlled  Patient has  no complaints  Objective:     VITALS:    Vitals:    03/29/18 0445   BP: 133/69   Pulse: (!) 113   Resp: 18   Temp: (!) 102 5 °F (39 2 °C)       INS & OUTS:  No intake/output data recorded      LABS:  Lab Results   Component Value Date    HGB 10 4 (L) 03/29/2018    HCT 30 5 (L) 03/29/2018    WBC 12 20 (H) 03/29/2018     03/29/2018   ]    Lab Results   Component Value Date     (L) 03/28/2018    K 3 6 03/28/2018    CL 98 (L) 03/28/2018    CO2 20 (L) 03/28/2018    BUN 13 03/28/2018    CREATININE 1 43 (H) 03/28/2018    CALCIUM 8 8 03/28/2018    GLUCOSE 147 (H) 03/28/2018   ]    INPATIENT MEDS:    Current Facility-Administered Medications:     acetaminophen (TYLENOL) tablet 650 mg, 650 mg, Oral, Q6H PRN, Mallika Garcia MD, 650 mg at 03/29/18 0500    cefTRIAXone (ROCEPHIN) IVPB (premix) 1,000 mg, 1,000 mg, Intravenous, Q24H, Mallika Garcia MD, Last Rate: 100 mL/hr at 03/29/18 0557, 1,000 mg at 03/29/18 0557    enoxaparin (LOVENOX) subcutaneous injection 40 mg, 40 mg, Subcutaneous, Daily, Mallika Garcia MD    HYDROmorphone (DILAUDID) injection 1 mg, 1 mg, Intravenous, Q4H PRN, Mallika Garcia MD, 1 mg at 03/29/18 0457    ondansetron (ZOFRAN) injection 4 mg, 4 mg, Intravenous, Q4H PRN, Mallika Garcia MD    polyethylene glycol (MIRALAX) packet 17 g, 17 g, Oral, Daily, Mallika Garcia MD    sodium chloride 0 9 % infusion, 125 mL/hr, Intravenous, Continuous, Mallika Garcia MD, Last Rate: 125 mL/hr at 03/29/18 0503, 125 mL/hr at 03/29/18 0503      Physical Exam:     GEN: no acute distress    RESP: breathing comfortably with no accessory muscle use    CARDIO: Regular rate and rhythm  ABD: soft, non-tender, non-distended   INCISION: none   EXT: no significant peripheral edema   DRAINS: none  CARTY: none        RADIOLOGY:   CT 3/28/18  IMPRESSION:  1  1 0 x 0 6 x 1 8 cm obstructive stone seen in the proximal left ureter resulting in left hydronephrosis and hydroureter  Just proximal to this in the right ureter A0 0 5 x 0 8 x 0 9 cm stone is also noted  Additionally, multiple nonobstructing stones   are seen in the lower pole of the left kidney largest of which measures 6 mm  There is a delayed left nephrogram and left perinephric stranding  2  5 3 x 3 9 x 4 0 cm multiloculated cystic lesion in the left ovary for which dedicated ultrasound (urgently, if there is clinical concern for ovarian torsion; if there is no clinical concern, this can be done electively or can be skipped and an MRI can be   performed) and/or elective MRI of the pelvis without and with contrast are recommended for further evaluation       Assessment:     1 8cm obstructive calculi in the proximal left ureter resulting in left hydronephrosis and hydroureter    Plan:     - patient has not yet been to IR, will proceed with OR stent placement instead  - made NPO  - pain control p r n  - patient and father aware she will need definitive stone management at a later date once infection treated

## 2018-03-29 NOTE — TREATMENT PLAN
Treatment Plan - Urology   Juanjose Francisco 23 y o  female MRN: [id number]  Unit/Bed#: YU93 Encounter: [id number]    Paged by Dr Megan Chiang regarding this patient  1 8cm obstructing stone with Lactate 2 2, WBC 17 5, Febrile to 102 on presentation  SEPSIS criteria initiated with CT showing obstructive uropathy and left 1 8cm stone  Patient's last PO intake was 4pm with food and 9pm with liquid  Otherwise healthy 23year old with 3-4 days of left flank pain on presentation  Sepsis protocol was initiated with fluids, etc  UA reviewed with + UTI findings  Called Dr Yuriy Sampson to discuss  Given patient's obstructive uropathy and failure to improve, patient requires LEFT Percutaneous Nephrostomy Tube  Plan for transfer of patient to [facility] with Emergent placement of LEFT PCN upon arrival  Discussed with Dr Venessa Pickett, who will arrange       Eleanor Najjar, PA-C  Date: 3/29/2018 Time: 1:33 AM

## 2018-03-29 NOTE — OP NOTE
OPERATIVE REPORT  PATIENT NAME: Yves Slaughter    :  1995  MRN: [id number]  Pt Location: [facility] OR ROOM 07    SURGERY DATE: 3/29/2018    Surgeon(s) and Role:     * Graciela Zelaya MD - Primary    Preop Diagnosis:  Ureteral stone [N20 1]    Post-Op Diagnosis Codes:     * Ureteral stone [N20 1]    Procedure(s) (LRB):  CYSTOSCOPY RETROGRADE PYELOGRAM WITH INSERTION STENT URETERAL (Left)    Specimen(s):  * No specimens in log *    Estimated Blood Loss:   Minimal    Drains:       Anesthesia Type:   General    Operative Indications:  Ureteral stone [N20 1]  Fever with hydronephrosis    Operative Findings:  Mild hydronephrosis    Complications:   None    Procedure and Technique:  The patient was brought to the operating room identified  A general anesthetic was administered  The patient was placed in lithotomy position prepped and draped in sterile fashion  A time-out was performed  The 25 [place] cystoscope sheath-30 degree telescope was inserted under direct vision  The urethra was normal   The bladder was normal   Both orifices were normal   The mucosa was normal   There was no trabeculation  A 0 035 inch Kike-coated wire was inserted up the left ureteral orifice to the level of the kidney without difficulty  A 6 [place] stent was passed over the wire and positioned with 2 coils in the upper pole and 1 coil in the urinary bladder  Contrast was injected confirming the position  The bladder was emptied  Final fluoroscopic images confirmed stent position       I was present for the entire procedure    Patient Disposition:  PACU     SIGNATURE: Graciela Zelaya MD  DATE: 2018  TIME: 11:56 AM

## 2018-03-29 NOTE — ANESTHESIA PREPROCEDURE EVALUATION
Review of Systems/Medical History          Cardiovascular    Comment: tachycardic,  Pulmonary  Negative pulmonary ROS        GI/Hepatic  Negative GI/hepatic ROS          Kidney stones,   Comment: urosepsis     Endo/Other  Negative endo/other ROS      GYN  Negative gynecology ROS          Hematology  Negative hematology ROS      Musculoskeletal  Negative musculoskeletal ROS        Neurology  Negative neurology ROS      Psychology   Negative psychology ROS                   Anesthesia Plan  ASA Score- 3     Anesthesia Type- general with ASA Monitors  Additional Monitors:   Airway Plan: ETT  Plan Factors-    Induction- intravenous  Postoperative Plan-     Informed Consent- Anesthetic plan and risks discussed with patient

## 2018-03-29 NOTE — H&P
History and Physical - [facility] Internal Medicine    Patient Information: Celia Mays 23 y o  female MRN: [id number]  Unit/Bed#: [facility] [address] [place] [address] Encounter: [id number]  Admitting Physician: Savi Gar MD  PCP: Guera Toussaint MD  Date of Admission:  03/29/18        Chief Complaint:   Fever and left flank pain    History of Present Illness:    Celia Mays is a 23 y o  female with no significant past medical history presenting to [facility] ER with fever and left flank pain  Patient vomited x2 today  CT scan of the abdomen done at [facility] ER confirmed the presence of 1 8 obstructing left ureteral stone  Patient was found to be septic with fever, leukocytosis, and tachycardia  Patient was transferred to our facility for IR to place left nephrostomy tube and for IV antibiotics for management of sepsis secondary to UTI  Denies any chest pain, SOB, coughing or palpitations  No diarrhea  No dysuria or polyuria  No headaches or dizziness  Review of Systems:  All 10 point review of systems was discussed and otherwise negative    Past Medical and Surgical History:     No past medical history on file  No past surgical history on file  Meds/Allergies:    Prior to Admission medications    Not on File     Patient is not taking medications as an outpatient    Allergies: Allergies   Allergen Reactions    Shellfish-Derived Products Hives       Social History:     Marital Status: Single     History   Alcohol Use    Yes     Comment: occasionally     History   Smoking Status    Never Smoker   Smokeless Tobacco    Never Used     History   Drug Use No       Family History:  Asked and noncontributory    Physical Exam:     Vitals:   Blood Pressure: 133/69 (03/29/18 0445)  Pulse: (!) 113 (03/29/18 0445)  Temperature: (!) 102 5 °F (39 2 °C) (03/29/18 0445)  Temp Source: Tympanic (03/29/18 0445)  Respirations: 18 (03/29/18 0445)    General: Alert , Ox3  Head: Normocephalic atraumatic  Eyes: LINA   Anicteric sclera  H&N: Supple neck  No palpable lymphadenopathy  Chest CTA BL  No wheezing or crackles  Heart: S1, S2 RRR, no murmur  Abd: soft, Non tender, non distended  Left CVA tenderness  Extremities: No oedema  No clubbing or cyanosis  Skin: Intact, no rash  Neuro: Moving all 4 extremities  Additional Data:     Lab Results: I have personally reviewed pertinent reports  Results from last 7 days  Lab Units 03/29/18  0550 03/28/18  2225   WBC Thousand/uL 12 20* 17 51*   HEMOGLOBIN g/dL 10 4* 12 3   HEMATOCRIT % 30 5* 35 6   PLATELETS Thousands/uL 176 211   LYMPHO PCT %  --  4*   MONO PCT MAN %  --  7   EOSINO PCT MANUAL %  --  0       Results from last 7 days  Lab Units 03/28/18  2225   SODIUM mmol/L 132*   POTASSIUM mmol/L 3 6   CHLORIDE mmol/L 98*   CO2 mmol/L 20*   BUN mg/dL 13   CREATININE mg/dL 1 43*   CALCIUM mg/dL 8 8   TOTAL PROTEIN g/dL 7 7   BILIRUBIN TOTAL mg/dL 0 60   ALK PHOS U/L 65   ALT U/L 23   AST U/L 16   GLUCOSE RANDOM mg/dL 147*       Results from last 7 days  Lab Units 03/28/18  2225   INR  1 27*       Imaging: I have personally reviewed pertinent reports  Ct Abdomen Pelvis With Contrast    Result Date: 3/29/2018  Narrative: CT ABDOMEN AND PELVIS WITH IV CONTRAST INDICATION:   LUQ/LLQ abd pain +n/v x3d +fever  no hx GI/ surgery  COMPARISON: None  TECHNIQUE:  CT examination of the abdomen and pelvis was performed  Axial, sagittal, and coronal 2D reformatted images were created from the source data and submitted for interpretation  Radiation dose length product (DLP) for this visit:  306 29 mGy-cm   This examination, like all CT scans performed in the [facility], was performed utilizing techniques to minimize radiation dose exposure, including the use of iterative  reconstruction and automated exposure control  IV Contrast:  100 mL of iohexol (OMNIPAQUE) Enteric Contrast:  Enteric contrast was not administered   FINDINGS: ABDOMEN LOWER CHEST:  No clinically significant abnormality identified in the visualized lower chest  LIVER/BILIARY TREE:  Unremarkable  GALLBLADDER:  No calcified gallstones  No pericholecystic inflammatory change  SPLEEN:  Unremarkable  PANCREAS:  Unremarkable  ADRENAL GLANDS:  Unremarkable  KIDNEYS/URETERS:  1 0 x 0 6 x 1 8 cm obstructive stone seen in the proximal left ureter resulting in left hydronephrosis and hydroureter  Just proximal to this in the right ureter A0 0 5 x 0 8 x 0 9 cm stone is also noted  Additionally, multiple nonobstructing stones are seen in the lower pole of the left kidney largest of which measures 6 mm  There is a delayed left nephrogram and left perinephric stranding  Subcentimeter hypodensity in the interpolar region of the right kidney is too small to characterize however in the absence of other known disease is overwhelmingly statistically likely to be a simple cyst   Otherwise normal right kidney and ureter  STOMACH AND BOWEL:  Unremarkable  APPENDIX:  No findings to suggest appendicitis  Normal appendix is visualized  ABDOMINOPELVIC CAVITY:  No ascites or free intraperitoneal air  No lymphadenopathy  VESSELS:  Unremarkable for patient's age  PELVIS REPRODUCTIVE ORGANS:  5 3 x 3 9 x 4 0 cm multiloculated cystic lesion in the left ovary for which dedicated ultrasound and/or MRI with contrast recommended for further evaluation  Suyapa Cunning URINARY BLADDER:  Unremarkable  ABDOMINAL WALL/INGUINAL REGIONS:  Unremarkable  OSSEOUS STRUCTURES:  No acute fracture or destructive osseous lesion  Impression: 1 0 x 0 6 x 1 8 cm obstructive stone seen in the proximal left ureter resulting in left hydronephrosis and hydroureter  Just proximal to this in the right ureter A0 0 5 x 0 8 x 0 9 cm stone is also noted  Additionally, multiple nonobstructing stones are seen in the lower pole of the left kidney largest of which measures 6 mm  There is a delayed left nephrogram and left perinephric stranding   5 3 x 3 9 x 4 0 cm multiloculated cystic lesion in the left ovary for which dedicated ultrasound (urgently, if there is clinical concern for ovarian torsion; if there is no clinical concern, this can be done electively or can be skipped and an MRI can be  performed) and/or elective MRI of the pelvis without and with contrast are recommended for further evaluation     I personally discussed this study with Kristy Davila on 3/29/2018 at 12:17 AM  Workstation performed: [id number]       Assessment/Plan:    Hospital Problem List:     Principal Problem:    Acute pyelonephritis  Active Problems:    Sepsis ([address] )    Ureteral stone      Plan for the Primary Problem(s):  Acute left pyelonephritis  Sepsis (fever, leukocytosis, tachycardia)  Will start patient on IV Rocephin  Blood cultures x2  Urine culture and sensitivity  Symptomatic treatment of fever with p r n  Tylenol  Pain control with IV Dilaudid p r n   Monitor for sepsis  Daily CBCs to follow up on leukocytosis  Further recommendations will depend on progression of the case    Left obstructing ureteral stone  Will consult IR for nephrostomy tube placement  Urology was consulted      VTE Prophylaxis: Enoxaparin (Lovenox)  / sequential compression device   Code Status: FC  POLST: POLST form is not discussed and not completed at this time  Anticipated Length of Stay:  Patient will be admitted on an Inpatient basis with an anticipated length of stay of  > 2 midnights  Justification for Hospital Stay:  Need for IV antibiotics and surgical procedure    Total Time for Visit, including Counseling / Coordination of Care: 30 minutes  Greater than 50% of this total time spent on direct patient counseling and coordination of care  ** Please Note: Dragon 360 Dictation voice to text software may have been used in the creation of this document   **

## 2018-03-30 LAB
ANION GAP SERPL CALCULATED.3IONS-SCNC: 11 MMOL/L (ref 4–13)
BUN SERPL-MCNC: 11 MG/DL (ref 5–25)
CALCIUM SERPL-MCNC: 8.6 MG/DL (ref 8.3–10.1)
CHLORIDE SERPL-SCNC: 108 MMOL/L (ref 100–108)
CO2 SERPL-SCNC: 21 MMOL/L (ref 21–32)
CREAT SERPL-MCNC: 0.99 MG/DL (ref 0.6–1.3)
ERYTHROCYTE [DISTWIDTH] IN BLOOD BY AUTOMATED COUNT: 13.1 % (ref 11.6–15.1)
GFR SERPL CREATININE-BSD FRML MDRD: 82 ML/MIN/1.73SQ M
GLUCOSE SERPL-MCNC: 112 MG/DL (ref 65–140)
HCT VFR BLD AUTO: 31.9 % (ref 34.8–46.1)
HGB BLD-MCNC: 10.7 G/DL (ref 11.5–15.4)
MCH RBC QN AUTO: 29.7 PG (ref 26.8–34.3)
MCHC RBC AUTO-ENTMCNC: 33.5 G/DL (ref 31.4–37.4)
MCV RBC AUTO: 89 FL (ref 82–98)
PLATELET # BLD AUTO: 161 THOUSANDS/UL (ref 149–390)
PMV BLD AUTO: 11 FL (ref 8.9–12.7)
POTASSIUM SERPL-SCNC: 3.7 MMOL/L (ref 3.5–5.3)
RBC # BLD AUTO: 3.6 MILLION/UL (ref 3.81–5.12)
SODIUM SERPL-SCNC: 140 MMOL/L (ref 136–145)
WBC # BLD AUTO: 8.43 THOUSAND/UL (ref 4.31–10.16)

## 2018-03-30 PROCEDURE — 85027 COMPLETE CBC AUTOMATED: CPT | Performed by: INTERNAL MEDICINE

## 2018-03-30 PROCEDURE — 99232 SBSQ HOSP IP/OBS MODERATE 35: CPT | Performed by: PHYSICIAN ASSISTANT

## 2018-03-30 PROCEDURE — 80048 BASIC METABOLIC PNL TOTAL CA: CPT | Performed by: INTERNAL MEDICINE

## 2018-03-30 PROCEDURE — 99232 SBSQ HOSP IP/OBS MODERATE 35: CPT | Performed by: INTERNAL MEDICINE

## 2018-03-30 RX ADMIN — OXYCODONE HYDROCHLORIDE AND ACETAMINOPHEN 1 TABLET: 5; 325 TABLET ORAL at 13:39

## 2018-03-30 RX ADMIN — HEPARIN SODIUM 5000 UNITS: 5000 INJECTION, SOLUTION INTRAVENOUS; SUBCUTANEOUS at 21:35

## 2018-03-30 RX ADMIN — OXYCODONE HYDROCHLORIDE AND ACETAMINOPHEN 1 TABLET: 5; 325 TABLET ORAL at 18:51

## 2018-03-30 RX ADMIN — CEFTRIAXONE 1000 MG: 1 INJECTION, SOLUTION INTRAVENOUS at 05:14

## 2018-03-30 RX ADMIN — HEPARIN SODIUM 5000 UNITS: 5000 INJECTION, SOLUTION INTRAVENOUS; SUBCUTANEOUS at 13:43

## 2018-03-30 RX ADMIN — ACETAMINOPHEN 650 MG: 325 TABLET, FILM COATED ORAL at 22:40

## 2018-03-30 RX ADMIN — OXYCODONE HYDROCHLORIDE AND ACETAMINOPHEN 1 TABLET: 5; 325 TABLET ORAL at 09:16

## 2018-03-30 RX ADMIN — OXYCODONE HYDROCHLORIDE AND ACETAMINOPHEN 1 TABLET: 5; 325 TABLET ORAL at 03:14

## 2018-03-30 RX ADMIN — OXYCODONE HYDROCHLORIDE AND ACETAMINOPHEN 1 TABLET: 5; 325 TABLET ORAL at 23:43

## 2018-03-30 RX ADMIN — HEPARIN SODIUM 5000 UNITS: 5000 INJECTION, SOLUTION INTRAVENOUS; SUBCUTANEOUS at 05:14

## 2018-03-30 NOTE — ASSESSMENT & PLAN NOTE
1 Day Post-Op  Procedure(s):  CYSTOSCOPY RETROGRADE PYELOGRAM WITH INSERTION STENT URETERAL  Surgeon(s):  Aurora Acosta MD  3/29/2018    Plan:  Outpatient follow-up with Dr Byron King to plan stent removal

## 2018-03-30 NOTE — ASSESSMENT & PLAN NOTE
T-max 101 with tachycardia up to 106 yesterday  White count has resolved and is now 8 43  Creatinine has normalized now at 0 99 status post stenting  Current coverage with ceftriaxone  Urine culture pending  Plan:  Continue ceftriaxone  Await urine culture  Tailor antibiotics appropriately  Recommend 14 days total of antibiotics with outpatient urologic follow-up

## 2018-03-30 NOTE — PROGRESS NOTES
[facility] Internal Medicine Progress Note  Patient: Angel Bedolla 23 y o  female   MRN: [id number]  PCP: Teri Ken MD  Unit/Bed#: [facility] [address] [id number] Encounter: [id number]  Date Of Visit: 03/30/18      Assessment/plan  1  Sepsis due to obstructive left pyelonephritis- appreciate ID recommendations  Continue current antibiotics  Awaiting urine culture  2  Nephrolithiasis- s/p cysto with stent placement  Follow up outpt with Dr Keagan Gustafson    3  Vallarie Chiquita- resolved  4  Acute respiratory failure after procedure- due to atelectasis- since resolved with incentive spirometry    Subjective:   Pt seen and examined  She is feeling better  No f/c no cp no sob no n/v/d no abd pain    Objective:     Vitals: Blood pressure 124/78, pulse 82, temperature 99 3 °F (37 4 °C), temperature source Temporal, resp  rate 17, last menstrual period 03/07/2018, SpO2 95 %  ,There is no height or weight on file to calculate BMI  Lab, Imaging and other studies:    Results from last 7 days  Lab Units 03/30/18  0516  03/28/18  2225   WBC Thousand/uL 8 43  < > 17 51*   HEMOGLOBIN g/dL 10 7*  < > 12 3   HEMATOCRIT % 31 9*  < > 35 6   PLATELETS Thousands/uL 161  < > 211   INR   --   --  1 27*   < > = values in this interval not displayed  Results from last 7 days  Lab Units 03/30/18  0516  03/28/18  2225   SODIUM mmol/L 140  < > 132*   POTASSIUM mmol/L 3 7  < > 3 6   CHLORIDE mmol/L 108  < > 98*   CO2 mmol/L 21  < > 20*   BUN mg/dL 11  < > 13   CREATININE mg/dL 0 99  < > 1 43*   CALCIUM mg/dL 8 6  < > 8 8   TOTAL PROTEIN g/dL  --   --  7 7   BILIRUBIN TOTAL mg/dL  --   --  0 60   ALK PHOS U/L  --   --  65   ALT U/L  --   --  23   AST U/L  --   --  16   GLUCOSE RANDOM mg/dL 112  < > 147*   < > = values in this interval not displayed        Lab Results   Component Value Date    BLOODCX No Growth at 24 hrs  03/29/2018    BLOODCX No Growth at 24 hrs  03/29/2018    BLOODCX No Growth at 24 hrs  03/28/2018    URINECX 50,000-59,000 cfu/ml Proteus species (A) 03/28/2018         Scheduled Meds:   Current Facility-Administered Medications:  acetaminophen 650 mg Oral Q4H PRN Shea Aguilar MD    cefTRIAXone 1,000 mg Intravenous Q24H Mallika Garcia MD Last Rate: 1,000 mg (03/30/18 0514)   heparin (porcine) 5,000 Units Subcutaneous Q8H [facility] Adrienneyinka Bennett DO    ondansetron 4 mg Intravenous Q4H PRN Mallika Garcia MD    oxyCODONE-acetaminophen 1 tablet Oral Q4H PRN Shea Aguilar MD    sodium chloride 125 mL/hr Intravenous Continuous Mallika Garcia MD Last Rate: Stopped (03/30/18 0830)   zolpidem 5 mg Oral HS PRN Shea Aguilar MD      Continuous Infusions:   sodium chloride 125 mL/hr Last Rate: Stopped (03/30/18 0830)     PRN Meds:   acetaminophen    ondansetron    oxyCODONE-acetaminophen    zolpidem      Physical exam:  Physical Exam  General appearance: alert and oriented, in no acute distress  Head: Normocephalic, without obvious abnormality, atraumatic  Eyes: conjunctivae/corneas clear  PERRL, EOM's intact  Fundi benign    Neck: no adenopathy, no carotid bruit, no JVD, supple, symmetrical, trachea midline and thyroid not enlarged, symmetric, no tenderness/mass/nodules  Lungs: clear to auscultation bilaterally  Heart: regular rate and rhythm, S1, S2 normal, no murmur, click, rub or gallop  Abdomen: soft, non-tender; bowel sounds normal; no masses,  no organomegaly  Extremities: extremities normal, warm and well-perfused; no cyanosis, clubbing, or edema  Pulses: 2+ and symmetric  Skin: Skin color, texture, turgor normal  No rashes or lesions  Neurologic: Grossly normal      VTE Pharmacologic Prophylaxis: Heparin  VTE Mechanical Prophylaxis: none    Counseling / Coordination of Care  Total floor / unit time spent today 20 minutes    Current Length of Stay: 1 day(s)    Current Patient Status: Inpatient       Code Status: Level 1 - Full Code

## 2018-03-30 NOTE — PROGRESS NOTES
Progress Note - Urology  Evette Davis 1995, 23 y o  female MRN: [id number]    [facility] Encounter: [id number]    Ureteral stone   Assessment & Plan    1 Day Post-Op  Procedure(s):  CYSTOSCOPY RETROGRADE PYELOGRAM WITH INSERTION STENT URETERAL  Surgeon(s):  Rosalba Bryan MD  3/29/2018    Plan:  Outpatient follow-up with Dr Juan Antonio Sanchez to plan stent removal            * Acute pyelonephritis   Assessment & Plan    T-max 101 with tachycardia up to 106 yesterday  White count has resolved and is now 8 43  Creatinine has normalized now at 0 99 status post stenting  Current coverage with ceftriaxone  Urine culture pending  Plan:  Continue ceftriaxone  Await urine culture  Tailor antibiotics appropriately  Recommend 14 days total of antibiotics with outpatient urologic follow-up  Subjective/Objective     Subjective:   She reports a restful night  No nausea or vomiting  Some frequency  Mild dysuria with a little bit of burning  No gross hematuria noted  No significant stent discomfort  Has been voiding on her own without issue  No chest pain or shortness of breath  Objective:  Vitals: Blood pressure 124/78, pulse 82, temperature 99 3 °F (37 4 °C), temperature source Temporal, resp  rate 17, last menstrual period 03/07/2018, SpO2 95 %  ,There is no height or weight on file to calculate BMI  Intake/Output Summary (Last 24 hours) at 03/30/18 0813  Last data filed at 03/29/18 1406   Gross per 24 hour   Intake             3550 ml   Output                0 ml   Net             3550 ml       Invasive Devices     Peripheral Intravenous Line            Peripheral IV 03/28/18 Right Antecubital 1 day          Drain            Ureteral Drain/Stent Left ureter 6 Fr  less than 1 day                Physical Exam   Constitutional: She is oriented to person, place, and time  She appears well-developed and well-nourished  No distress     Pleasant 26-year-old female resting comfortably in bed  Denying any pain throughout the examination  No acute distress  Answering questions appropriately  HENT:   Head: Atraumatic  Eyes: Conjunctivae and EOM are normal    Neck: No thyromegaly present  Cardiovascular: Normal rate, regular rhythm and normal heart sounds  Pulmonary/Chest: Effort normal and breath sounds normal  No respiratory distress  Abdominal: Soft  Bowel sounds are normal  She exhibits no distension and no mass  There is no tenderness  There is no rebound and no guarding  Musculoskeletal: Normal range of motion  She exhibits no edema  Neurological: She is alert and oriented to person, place, and time  Skin: Skin is warm and dry  No rash noted  She is not diaphoretic  No erythema  No pallor  Psychiatric: She has a normal mood and affect  Her behavior is normal  Judgment and thought content normal    Nursing note and vitals reviewed          Labs:  Recent Labs      03/28/18 2225  03/29/18   0550  03/30/18   0516   WBC  17 51*  12 20*  8 43     Recent Labs      03/28/18 2225 03/29/18   0550  03/30/18   0516   HGB  12 3  10 4*  10 7*       Recent Labs      03/28/18 2225  03/29/18   0550  03/30/18   0516   CREATININE  1 43*  1 32*  0 99     Neno Wilkinson PA-C  Date: 3/30/2018 Time: 8:13 AM

## 2018-03-30 NOTE — CASE MANAGEMENT
Initial Clinical Review    Admission: Date/Time/Statement: 3/29/18 @ 0421 Inpatient Written     Orders Placed This Encounter   Procedures    Inpatient Admission     Standing Status:   Standing     Number of Occurrences:   1     Order Specific Question:   Admitting Physician     Answer:   Desiree Hammer [[id number]]     Order Specific Question:   Level of Care     Answer:   Med Surg [[id number]]     Order Specific Question:   Estimated length of stay     Answer:   More than 2 Midnights     Order Specific Question:   Certification     Answer:   I certify that inpatient services are medically necessary for this patient for a duration of greater than two midnights  See H&P and MD Progress Notes for additional information about the patient's course of treatment  Chief Complaint: Fever and left flank pain; TRANSFER FROM [facility]    History of Illness: Tyler Kaufman is a 23 y o  female with no significant past medical history presenting to [facility] ER with fever and left flank pain  Patient vomited x2 today  CT scan of the abdomen done at [facility] ER confirmed the presence of 1 8 obstructing left ureteral stone  Patient was found to be septic with fever, leukocytosis, and tachycardia  Patient was transferred to our facility for IR to place left nephrostomy tube and for IV antibiotics for management of sepsis secondary to UTI      ED Vital Signs:   ED Triage Vitals   Temperature Pulse Respirations Blood Pressure SpO2   03/29/18 0445 03/29/18 0445 03/29/18 0445 03/29/18 0445 03/29/18 0700   (!) 102 5 °F (39 2 °C) (!) 113 18 133/69 98 %      Temp Source Heart Rate Source Patient Position - Orthostatic VS BP Location FiO2 (%)   03/29/18 0445 03/29/18 0445 03/29/18 0445 03/29/18 0445 --   Tympanic Monitor Lying Left arm       Pain Score       03/29/18 0445       7        Wt Readings from Last 1 Encounters:   03/28/18 68 kg (150 lb)       Vital Signs (abnormal): TEMP 104 2, -113, O2 sat 90% on NC    Abnormal Labs/Diagnostic Test Results:   WBC 12 20*   HEMOGLOBIN 10 4*   HEMATOCRIT 30 5*     03/28/18 urine cx:  RBC, UA 1-2     WBC, UA  Innumerable     Bacteria, UA  Moderate       Leukocytes, UA Large     Protein, UA 30 (1+)     Ketones, UA Trace     Blood, UA  Moderate         03/28/18 CT Abd, pelvis:  1 0 x 0 6 x 1 8 cm obstructive stone seen in the proximal left ureter resulting in left hydronephrosis and hydroureter  Just proximal to this in the right ureter A0 0 5 x 0 8 x 0 9 cm stone is also noted  Additionally, multiple nonobstructing stones are seen in the lower pole of the left kidney largest of which measures 6 mm  There is a delayed left nephrogram and left perinephric stranding  5 3 x 3 9 x 4 0 cm multiloculated cystic lesion in the left ovary for which dedicated ultrasound (urgently, if there is clinical concern for ovarian torsion; if there is no clinical concern, this can be done electively or can be skipped and an MRI can be  performed) and/or elective MRI of the pelvis without and with contrast are recommended for further evaluation  03/29/18 XR retrograde pyelogram:  Fluoroscopic guidance provided for left retrograde pyelogram  Please see procedure report for further details  Past Medical/Surgical History: Active Ambulatory Problems     Diagnosis Date Noted    No Active Ambulatory Problems     Resolved Ambulatory Problems     Diagnosis Date Noted    No Resolved Ambulatory Problems     No Additional Past Medical History       Admitting Diagnosis: Sepsis ([address] ) [A41 9]    Age/Sex: 23 y o  female    Assessment/Plan:   Principal Problem:    Acute pyelonephritis  Active Problems:    Sepsis ([address] )    Ureteral stone     Plan for the Primary Problem(s):  Acute left pyelonephritis  Sepsis (fever, leukocytosis, tachycardia)  Will start patient on IV Rocephin  Blood cultures x2  Urine culture and sensitivity  Symptomatic treatment of fever with p r n  Tylenol  Pain control with IV Dilaudid p r n  Rosella Goldmann Monitor for sepsis  Daily CBCs to follow up on leukocytosis  Further recommendations will depend on progression of the case     Left obstructing ureteral stone  Will consult IR for nephrostomy tube placement  Urology was consulted     VTE Prophylaxis: Enoxaparin (Lovenox)  / sequential compression device   Code Status: FC  POLST: POLST form is not discussed and not completed at this time      Anticipated Length of Stay:  Patient will be admitted on an Inpatient basis with an anticipated length of stay of  > 2 midnights  Justification for Hospital Stay:  Need for IV antibiotics and surgical procedure    Admission Orders:  NPO  OR for cystoscopy retrograde pyelogram with ureteral stent insertion  Bedrest with BRP  Consult ID  Blood and Urine cx  O2 NC 6L  Sequential compression device    Scheduled Meds:   Current Facility-Administered Medications:  acetaminophen 650 mg Oral Q4H PRN   cefTRIAXone 1,000 mg Intravenous Q24H   heparin (porcine) 5,000 Units Subcutaneous Q8H [facility]   ondansetron 4 mg Intravenous Q4H PRN   oxyCODONE-acetaminophen 1 tablet Oral Q4H PRN   sodium chloride 125 mL/hr Intravenous Continuous   zolpidem 5 mg Oral HS PRN     Continuous Infusions:   sodium chloride 125 mL/hr Last Rate: 125 mL/hr (03/29/18 1331)     PRN Meds:   Acetaminophen 650mg PO x1 thus far    Ondansetron 4mg IV x1 thus far    oxyCODONE-acetaminophen 1 tab x3 thus far  Hydromorphone 1mg IV x1     zolpidem    Thank you,  [facility] in the [employer] for 2017  Network Utilization Review Department  Phone: [phone]; Fax [phone]  ATTENTION: The Network Utilization Review Department is now centralized for our 7 Facilities  Make a note that we have a new phone and fax numbers for our Department  Please call with any questions or concerns to [phone] and carefully follow the prompts so that you are directed to the right person   All voicemails are confidential  Fax any determinations, approvals, denials, and requests for initial or continue stay review clinical to [phone]  Due to HIGH CALL volume, it would be easier if you could please send faxed requests to expedite your requests and in part, help us provide discharge notifications faster

## 2018-03-30 NOTE — PROGRESS NOTES
Progress Note - Infectious Disease   Evette Davis 23 y o  female MRN: [id number]  Unit/Bed#: [facility] 2 -02 Encounter: [id number]      Impression/Recommendations:  1  Obstructive left pyelonephritis, secondary to left ureteral stone  Patient is clinically stable  She is status post left ureteral stent placement  Patient had high fever postprocedure but is clinically much improved today  WBC has normalized  Continue IV ceftriaxone  Follow-up on blood and urine cultures and adjust antibiotic accordingly  Monitor temperature/WBC      2  Sepsis, POA, presented with fever, leukocytosis and tachycardia  Source of sepsis is most likely UTI  Given lack of hospitalization antibiotic use, patient has low risk for resistant pathogens  She has been started on IV ceftriaxone  This is reasonable  Despite sepsis, patient is systemically well, without hypotension of toxicity  She is clinically improved  Temperature trending down  WBC has normalized  Antibiotic plan as in above  Monitor temperature/WBC  Monitor hemodynamics  Follow-up on pending blood cultures      3  Nephrolithiasis  This is unknown to patient  Urology evaluation noted  She will eventually need stone extraction, once pyelonephritis and sepsis resolved  Urology follow-up for stone extraction after pyelonephritis and sepsis resolve      4  REHAN, POA  This is probably a combination of sepsis and left ureteral obstruction  Creatinine has normalized  Antibiotic at full dose  Monitor creatinine      5   Multi loculated left ovarian cyst   This has incidental finding  Symptoms above are due to obstructive left pyelonephritis and not the versus  This can be worked up electively as an outpatient through her PCP      Discussed with patient in detail regarding the above plan  Antibiotics:  Ceftriaxone  POD # 1    Subjective:  Patient had high fever last night  Temperature is down now  No further chills    Left flank pain improved  No further urinary symptoms  She is tolerating antibiotic well  No nausea, vomiting or diarrhea    Objective:  Vitals:  HR:  [] 82  Resp:  [12-22] 17  BP: (114-140)/(51-78) 124/78  SpO2:  [90 %-98 %] 95 %  Temp (24hrs), Av 6 °F (38 1 °C), Min:99 1 °F (37 3 °C), Max:104 2 °F (40 1 °C)  Current: Temperature: 99 3 °F (37 4 °C)    Physical Exam:     General: Awake, alert, cooperative, no distress  Lungs: Expansion symmetric, no rales, no wheezing, respirations unlabored  Heart[de-identified]  Regular rate and rhythm, S1 and S2 normal, no murmur  Abdomen: Soft, nondistended, improved left flank tenderness, bowel sounds active all four quadrants,        no masses, no organomegaly  Extremities: No edema  No erythema/warmth  No ulcer  Nontender to palpation  Skin:  No rash  Invasive Devices     Peripheral Intravenous Line            Peripheral IV 18 Right Antecubital 1 day          Drain            Ureteral Drain/Stent Left ureter 6 Fr  less than 1 day                Labs studies:   I have personally reviewed pertinent labs  Results from last 7 days  Lab Units 18  0516 18  0550 18  2225   SODIUM mmol/L 140 137 132*   POTASSIUM mmol/L 3 7 3 5 3 6   CHLORIDE mmol/L 108 105 98*   CO2 mmol/L 21 19* 20*   ANION GAP mmol/L 11 13 14*   BUN mg/dL 11 13 13   CREATININE mg/dL 0 99 1 32* 1 43*   EGFR ml/min/1 73sq m 82 58 53   GLUCOSE RANDOM mg/dL 112 116 147*   CALCIUM mg/dL 8 6 8 0* 8 8   AST U/L  --   --  16   ALT U/L  --   --  23   ALK PHOS U/L  --   --  65   TOTAL PROTEIN g/dL  --   --  7 7   BILIRUBIN TOTAL mg/dL  --   --  0 60       Results from last 7 days  Lab Units 18  0516 18  0550 18  2225   WBC Thousand/uL 8 43 12 20* 17 51*   HEMOGLOBIN g/dL 10 7* 10 4* 12 3   PLATELETS Thousands/uL 161 176 211       Results from last 7 days  Lab Units 18  2308 18  2307 18  2225   BLOOD CULTURE   --  No Growth at 24 hrs  No Growth at 24 hrs     URINE CULTURE  50,000-59,000 cfu/ml Proteus species*  --   --        Imaging Studies:   I have personally reviewed pertinent imaging study reports and images in PACS  EKG, Pathology, and Other Studies:   I have personally reviewed pertinent reports

## 2018-03-30 NOTE — PLAN OF CARE
DISCHARGE PLANNING     Discharge to home or other facility with appropriate resources Progressing        DISCHARGE PLANNING - CARE MANAGEMENT     Discharge to post-acute care or home with appropriate resources Progressing        GENITOURINARY - ADULT     Maintains or returns to baseline urinary function Progressing     Absence of urinary retention Progressing        INFECTION - ADULT     Absence or prevention of progression during hospitalization Progressing     Absence of fever/infection during neutropenic period Progressing        Knowledge Deficit     Patient/family/caregiver demonstrates understanding of disease process, treatment plan, medications, and discharge instructions Progressing        PAIN - ADULT     Verbalizes/displays adequate comfort level or baseline comfort level Progressing        SAFETY ADULT     Patient will remain free of falls Progressing     Maintain or return to baseline ADL function Progressing     Maintain or return mobility status to optimal level Progressing

## 2018-03-31 LAB
ANION GAP SERPL CALCULATED.3IONS-SCNC: 11 MMOL/L (ref 4–13)
BACTERIA UR CULT: ABNORMAL
BUN SERPL-MCNC: 11 MG/DL (ref 5–25)
CALCIUM SERPL-MCNC: 8.5 MG/DL (ref 8.3–10.1)
CHLORIDE SERPL-SCNC: 103 MMOL/L (ref 100–108)
CO2 SERPL-SCNC: 22 MMOL/L (ref 21–32)
CREAT SERPL-MCNC: 1.02 MG/DL (ref 0.6–1.3)
ERYTHROCYTE [DISTWIDTH] IN BLOOD BY AUTOMATED COUNT: 13.2 % (ref 11.6–15.1)
GFR SERPL CREATININE-BSD FRML MDRD: 79 ML/MIN/1.73SQ M
GLUCOSE SERPL-MCNC: 94 MG/DL (ref 65–140)
HCT VFR BLD AUTO: 31.5 % (ref 34.8–46.1)
HGB BLD-MCNC: 10.6 G/DL (ref 11.5–15.4)
LACTATE SERPL-SCNC: 0.9 MMOL/L (ref 0.5–2)
MCH RBC QN AUTO: 29.8 PG (ref 26.8–34.3)
MCHC RBC AUTO-ENTMCNC: 33.7 G/DL (ref 31.4–37.4)
MCV RBC AUTO: 89 FL (ref 82–98)
PLATELET # BLD AUTO: 186 THOUSANDS/UL (ref 149–390)
PMV BLD AUTO: 10.5 FL (ref 8.9–12.7)
POTASSIUM SERPL-SCNC: 3 MMOL/L (ref 3.5–5.3)
RBC # BLD AUTO: 3.56 MILLION/UL (ref 3.81–5.12)
SODIUM SERPL-SCNC: 136 MMOL/L (ref 136–145)
WBC # BLD AUTO: 6.87 THOUSAND/UL (ref 4.31–10.16)

## 2018-03-31 PROCEDURE — 99232 SBSQ HOSP IP/OBS MODERATE 35: CPT | Performed by: INTERNAL MEDICINE

## 2018-03-31 PROCEDURE — 99233 SBSQ HOSP IP/OBS HIGH 50: CPT | Performed by: INTERNAL MEDICINE

## 2018-03-31 PROCEDURE — 85027 COMPLETE CBC AUTOMATED: CPT | Performed by: INTERNAL MEDICINE

## 2018-03-31 PROCEDURE — 80048 BASIC METABOLIC PNL TOTAL CA: CPT | Performed by: INTERNAL MEDICINE

## 2018-03-31 PROCEDURE — 83605 ASSAY OF LACTIC ACID: CPT | Performed by: NURSE PRACTITIONER

## 2018-03-31 RX ORDER — POTASSIUM CHLORIDE 20 MEQ/1
40 TABLET, EXTENDED RELEASE ORAL ONCE
Status: COMPLETED | OUTPATIENT
Start: 2018-03-31 | End: 2018-03-31

## 2018-03-31 RX ADMIN — OXYCODONE HYDROCHLORIDE AND ACETAMINOPHEN 1 TABLET: 5; 325 TABLET ORAL at 11:57

## 2018-03-31 RX ADMIN — OXYCODONE HYDROCHLORIDE AND ACETAMINOPHEN 1 TABLET: 5; 325 TABLET ORAL at 05:55

## 2018-03-31 RX ADMIN — CEFAZOLIN SODIUM 1000 MG: 1 SOLUTION INTRAVENOUS at 11:57

## 2018-03-31 RX ADMIN — CEFAZOLIN SODIUM 1000 MG: 1 SOLUTION INTRAVENOUS at 19:52

## 2018-03-31 RX ADMIN — POTASSIUM CHLORIDE 40 MEQ: 1500 TABLET, EXTENDED RELEASE ORAL at 12:57

## 2018-03-31 RX ADMIN — HEPARIN SODIUM 5000 UNITS: 5000 INJECTION, SOLUTION INTRAVENOUS; SUBCUTANEOUS at 13:01

## 2018-03-31 RX ADMIN — OXYCODONE HYDROCHLORIDE AND ACETAMINOPHEN 1 TABLET: 5; 325 TABLET ORAL at 16:16

## 2018-03-31 RX ADMIN — OXYCODONE HYDROCHLORIDE AND ACETAMINOPHEN 1 TABLET: 5; 325 TABLET ORAL at 20:55

## 2018-03-31 RX ADMIN — HEPARIN SODIUM 5000 UNITS: 5000 INJECTION, SOLUTION INTRAVENOUS; SUBCUTANEOUS at 05:45

## 2018-03-31 RX ADMIN — CEFTRIAXONE 1000 MG: 1 INJECTION, SOLUTION INTRAVENOUS at 05:44

## 2018-03-31 RX ADMIN — HEPARIN SODIUM 5000 UNITS: 5000 INJECTION, SOLUTION INTRAVENOUS; SUBCUTANEOUS at 21:00

## 2018-03-31 NOTE — PROGRESS NOTES
[facility] Progress Note  Patient: Tyler Kaufman 23 y o  female   MRN: [id number]  PCP: Guanako Godinez MD  Unit/Bed#: [place] [address] [address] 218-02 Encounter: [id number]  Date Of Visit: 03/31/18      Assessment/plan  1  Sepsis due to obstructive left pyelonephritis- appreciate ID recommendations  Changed from ceftriaxone to ancef  Pt was febrile last night  If she is afebrile tomorrow she can be discharged to home otherwise will need repeat imagining       2  Nephrolithiasis- s/p cysto with stent placement  Follow up outpt with Dr Vicky Rhodes     3  violeta- resolved      4  Acute respiratory failure after procedure- due to atelectasis- since resolved with incentive spirometry    5  Hypokalemia- will replace    Subjective:   Pt seen and examined  Pt had a fever last night  No chills this am  No cp no sob no n/v/d no abd pain    Objective:     Vitals: Blood pressure 140/77, pulse 90, temperature 99 7 °F (37 6 °C), temperature source Temporal, resp  rate 18, last menstrual period 03/07/2018, SpO2 92 %  ,There is no height or weight on file to calculate BMI  Lab, Imaging and other studies:    Results from last 7 days  Lab Units 03/31/18  0555  03/28/18  2225   WBC Thousand/uL 6 87  < > 17 51*   HEMOGLOBIN g/dL 10 6*  < > 12 3   HEMATOCRIT % 31 5*  < > 35 6   PLATELETS Thousands/uL 186  < > 211   INR   --   --  1 27*   < > = values in this interval not displayed  Results from last 7 days  Lab Units 03/31/18  0555  03/28/18  2225   SODIUM mmol/L 136  < > 132*   POTASSIUM mmol/L 3 0*  < > 3 6   CHLORIDE mmol/L 103  < > 98*   CO2 mmol/L 22  < > 20*   BUN mg/dL 11  < > 13   CREATININE mg/dL 1 02  < > 1 43*   CALCIUM mg/dL 8 5  < > 8 8   TOTAL PROTEIN g/dL  --   --  7 7   BILIRUBIN TOTAL mg/dL  --   --  0 60   ALK PHOS U/L  --   --  65   ALT U/L  --   --  23   AST U/L  --   --  16   GLUCOSE RANDOM mg/dL 94  < > 147*   < > = values in this interval not displayed        Lab Results   Component Value Date    BLOODCX No Growth at 48 hrs  03/29/2018    BLOODCX No Growth at 48 hrs  03/29/2018    BLOODCX No Growth at 48 hrs  03/28/2018    URINECX 50,000-59,000 cfu/ml Proteus mirabilis (A) 03/28/2018       Scheduled Meds:   Current Facility-Administered Medications:  acetaminophen 650 mg Oral Q4H PRN Prabhu Joaquin MD    cefazolin 1,000 mg Intravenous Q8H Endy Vivar MD Last Rate: 1,000 mg (03/31/18 1157)   heparin (porcine) 5,000 Units Subcutaneous Q8H [address] Adrienne Bennett DO    ondansetron 4 mg Intravenous Q4H PRN Mallika Garcia MD    oxyCODONE-acetaminophen 1 tablet Oral Q4H PRN Prabhu Joaquin MD    zolpidem 5 mg Oral HS PRN Parbhu Joaquin MD      Continuous Infusions:    PRN Meds:   acetaminophen    ondansetron    oxyCODONE-acetaminophen    zolpidem      Physical exam:  Physical Exam  General appearance: alert and oriented, in no acute distress  Head: Normocephalic, without obvious abnormality, atraumatic  Eyes: conjunctivae/corneas clear  PERRL, EOM's intact  Fundi benign    Neck: no adenopathy, no carotid bruit, no JVD, supple, symmetrical, trachea midline and thyroid not enlarged, symmetric, no tenderness/mass/nodules  Lungs: clear to auscultation bilaterally  Heart: regular rate and rhythm, S1, S2 normal, no murmur, click, rub or gallop  Abdomen: soft, non-tender; bowel sounds normal; no masses,  no organomegaly  Extremities: extremities normal, warm and well-perfused; no cyanosis, clubbing, or edema  Pulses: 2+ and symmetric  Skin: Skin color, texture, turgor normal  No rashes or lesions  Neurologic: Grossly normal      VTE Pharmacologic Prophylaxis: Heparin  VTE Mechanical Prophylaxis: reason for no mechanical VTE prophylaxis no need for scd    Counseling / Coordination of Care  Total floor / unit time spent today 20 minutes     Current Length of Stay: 2 day(s)    Current Patient Status: Inpatient       Code Status: Level 1 - Full Code

## 2018-03-31 NOTE — PLAN OF CARE
INFECTION - ADULT     Absence of fever/infection during neutropenic period Not Progressing          DISCHARGE PLANNING     Discharge to home or other facility with appropriate resources Progressing        GENITOURINARY - ADULT     Maintains or returns to baseline urinary function Progressing     Absence of urinary retention Progressing        INFECTION - ADULT     Absence or prevention of progression during hospitalization Progressing        Knowledge Deficit     Patient/family/caregiver demonstrates understanding of disease process, treatment plan, medications, and discharge instructions Progressing        PAIN - ADULT     Verbalizes/displays adequate comfort level or baseline comfort level Progressing        SAFETY ADULT     Patient will remain free of falls Progressing     Maintain or return to baseline ADL function Progressing     Maintain or return mobility status to optimal level Progressing

## 2018-03-31 NOTE — PROGRESS NOTES
Progress Note - Infectious Disease   Evette Davis 23 y o  female MRN: [id number]  Unit/Bed#: [address] [address] 2 -02 Encounter: [id number]      Impression/Recommendations:  1   Obstructive left pyelonephritis, secondary to left ureteral stone   Patient is clinically stable   She is status post left ureteral stent placement  she is clinically improved  WBC has normalized  Urine culture with Proteus  Change antibiotic to IV cefazolin  Monitor temperature/WBC      2   Sepsis, POA, presented with fever, leukocytosis and tachycardia   Source of sepsis is most likely UTI  She is still febrile but is otherwise clinically and systemically improved  WBC has normalized  Blood cultures remain negative  Antibiotic plan as in above  Monitor temperature/WBC  Monitor hemodynamics  Follow-up on pending blood cultures      3   Persistent high fever  This may be secondary to inadequate antibiotic level in kidney  Will change antibiotic to IV cefazolin, which should have higher renal excretion  Given improvement of flank pain, hold off on reimaging for now  If fever persists despite antibiotic change, patient will need to have repeat imaging of kidney to rule out abscess  Antibiotic changes in above  Monitor temperature  If fever persists with antibiotic change, recheck CT of abdomen  4  Nephrolithiasis   This is unknown to patient   Urology evaluation noted   She will eventually need stone extraction, once pyelonephritis and sepsis resolved  Urology follow-up for stone extraction after pyelonephritis and sepsis resolve      5   REHAN, POA   This is probably a combination of sepsis and left ureteral obstruction   Creatinine has normalized  Antibiotic at full dose    Monitor creatinine      6   Multi loculated left ovarian cyst   This has incidental finding   Symptoms above are due to obstructive left pyelonephritis and not the versus   This can be worked up electively as an outpatient through her PCP      Discussed with patient in detail regarding the above plan  Discussed with Dr Radha Macdonald from [facility] service      Antibiotics:  Ceftriaxone  POD # 2     Subjective:  Patient still had high fever overnight    however, overall, she continues to feel improved  Left flank pain improved  No further urinary symptoms  She is tolerating antibiotic well  No nausea, vomiting or diarrhea    Objective:  Vitals:  HR:  [] 90  Resp:  [18] 18  BP: (119-140)/(61-77) 140/77  SpO2:  [92 %-100 %] 92 %  Temp (24hrs), Av 8 °F (38 2 °C), Min:98 3 °F (36 8 °C), Max:103 7 °F (39 8 °C)  Current: Temperature: 99 7 °F (37 6 °C)    Physical Exam:     General: Awake, alert, cooperative, no distress  Lungs: Expansion symmetric, no rales, no wheezing, respirations unlabored  Heart[de-identified]  Mildly tachycardic with regular rhythm, S1 and S2 normal, no murmur  Abdomen: Soft, nondistended, mild and improving left flank tenderness, bowel sounds active all four quadrants,        no masses, no organomegaly  Extremities: No edema  No erythema/warmth  No ulcer  Nontender to palpation  Skin:  No rash  Invasive Devices     Peripheral Intravenous Line            Peripheral IV 18 Left Antecubital less than 1 day          Drain            Ureteral Drain/Stent Left ureter 6 Fr  2 days                Labs studies:   I have personally reviewed pertinent labs      Results from last 7 days  Lab Units 18  0555 18  0516 18  0550 18  2225   SODIUM mmol/L 136 140 137 132*   POTASSIUM mmol/L 3 0* 3 7 3 5 3 6   CHLORIDE mmol/L 103 108 105 98*   CO2 mmol/L 22 21 19* 20*   ANION GAP mmol/L 11 11 13 14*   BUN mg/dL 11 11 13 13   CREATININE mg/dL 1 02 0 99 1 32* 1 43*   EGFR ml/min/1 73sq m 79 82 58 53   GLUCOSE RANDOM mg/dL 94 112 116 147*   CALCIUM mg/dL 8 5 8 6 8 0* 8 8   AST U/L  --   --   --  16   ALT U/L  --   --   --  23   ALK PHOS U/L  --   --   --  65   TOTAL PROTEIN g/dL  --   --   --  7 7   BILIRUBIN TOTAL mg/dL  --   --   -- 0 60       Results from last 7 days  Lab Units 03/31/18  0555 03/30/18  0516 03/29/18  0550   WBC Thousand/uL 6 87 8 43 12 20*   HEMOGLOBIN g/dL 10 6* 10 7* 10 4*   PLATELETS Thousands/uL 186 161 176       Results from last 7 days  Lab Units 03/29/18  0548 03/29/18  0541 03/28/18  2308 03/28/18  2307 03/28/18  2225   BLOOD CULTURE  No Growth at 48 hrs  No Growth at 48 hrs  --  No Growth at 48 hrs  No Growth at 48 hrs  URINE CULTURE   --   --  50,000-59,000 cfu/ml Proteus mirabilis*  --   --        Imaging Studies:   I have personally reviewed pertinent imaging study reports and images in PACS  EKG, Pathology, and Other Studies:   I have personally reviewed pertinent reports

## 2018-04-01 VITALS
OXYGEN SATURATION: 96 % | RESPIRATION RATE: 18 BRPM | DIASTOLIC BLOOD PRESSURE: 82 MMHG | SYSTOLIC BLOOD PRESSURE: 135 MMHG | HEART RATE: 83 BPM | TEMPERATURE: 98.7 F

## 2018-04-01 PROCEDURE — 99239 HOSP IP/OBS DSCHRG MGMT >30: CPT | Performed by: INTERNAL MEDICINE

## 2018-04-01 RX ORDER — CEPHALEXIN 500 MG/1
500 CAPSULE ORAL EVERY 6 HOURS SCHEDULED
Qty: 40 CAPSULE | Refills: 0 | Status: SHIPPED | OUTPATIENT
Start: 2018-04-01 | End: 2018-04-11

## 2018-04-01 RX ORDER — OXYCODONE HYDROCHLORIDE AND ACETAMINOPHEN 5; 325 MG/1; MG/1
1 TABLET ORAL EVERY 4 HOURS PRN
Qty: 15 TABLET | Refills: 0 | Status: SHIPPED | OUTPATIENT
Start: 2018-04-01 | End: 2018-04-11

## 2018-04-01 RX ADMIN — CEFAZOLIN SODIUM 1000 MG: 1 SOLUTION INTRAVENOUS at 04:02

## 2018-04-01 RX ADMIN — HEPARIN SODIUM 5000 UNITS: 5000 INJECTION, SOLUTION INTRAVENOUS; SUBCUTANEOUS at 05:01

## 2018-04-01 RX ADMIN — OXYCODONE HYDROCHLORIDE AND ACETAMINOPHEN 1 TABLET: 5; 325 TABLET ORAL at 04:01

## 2018-04-01 RX ADMIN — OXYCODONE HYDROCHLORIDE AND ACETAMINOPHEN 1 TABLET: 5; 325 TABLET ORAL at 08:07

## 2018-04-01 NOTE — DISCHARGE SUMMARY
Discharge Summary - [facility] Internal Medicine    Patient Information: Hank Gill 23 y o  female MRN: [id number]  Unit/Bed#: [place] [address] [facility] 87 218-02 Encounter: [id number]    Discharging Physician / Practitioner: Sammy Alberto DO  PCP: Feli Fried MD  Admission Date: 3/29/2018  Discharge Date: 04/01/18    Disposition:     Home     Reason for Admission: acute pyelonephritis    Discharge Diagnoses:     Principal Problem:    Acute pyelonephritis  Active Problems:    Sepsis ([address] )    Ureteral stone  Resolved Problems:    * No resolved hospital problems  *      Consultations During Hospital Stay:  · Urology   · ID    Procedures Performed:     · Cysto with stent placed    Significant Findings / Test Results:   Ct Abdomen Pelvis With Contrast  Result Date: 3/29/2018  Impression: 1 0 x 0 6 x 1 8 cm obstructive stone seen in the proximal left ureter resulting in left hydronephrosis and hydroureter  Just proximal to this in the right ureter A0 0 5 x 0 8 x 0 9 cm stone is also noted  Additionally, multiple nonobstructing stones are seen in the lower pole of the left kidney largest of which measures 6 mm  There is a delayed left nephrogram and left perinephric stranding  5 3 x 3 9 x 4 0 cm multiloculated cystic lesion in the left ovary for which dedicated ultrasound (urgently, if there is clinical concern for ovarian torsion; if there is no clinical concern, this can be done electively or can be skipped and an MRI can be  performed) and/or elective MRI of the pelvis without and with contrast are recommended for further evaluation      Incidental Findings:   multiloculated cystic lesion in the left ovary     Test Results Pending at Discharge (will require follow up):   · none     Outpatient Tests Requested:  None    Hospital Course:     Hank Gill is a 23 y o  female patient who originally presented to the hospital on 3/29/2018 due to Sepsis due to obstructive left pyelonephritis  She was started on ceftriaxone  There was a discussion about percutaneous nephrostomy tube but urology did take her for a cysto and placed a stent  She will follow up with Dr Anamika Deshpande  She did spike a temp with ceftriaxone  She was changed to ancef  No further fevers  Blood cultures were initially negative and remained negative  Pt will be changed to keflex to finish course  Infectious disease did follow with the pt while in hospital     She did have violeta that resolved with IVF   Pt had acute respiratory failure after procedure due to atelectasis after cysto  It has since resolved with incentive spirometry   She was incidentally found to have a multiloculated cyst of ovary  She will need a pelvic ultrasound outpt      She was discharged to home  Discharge Day Visit / Exam:     * Please refer to separate progress note for these details *    Discharge instructions/Information to patient and family:   See after visit summary for information provided to patient and family  Provisions for Follow-Up Care:  See after visit summary for information related to follow-up care and any pertinent home health orders  Discharge Statement:  I spent 32 minutes discharging the patient  This time was spent on the day of discharge  I had direct contact with the patient on the day of discharge  Greater than 50% of the total time was spent examining patient, answering all patient questions, arranging and discussing plan of care with patient as well as directly providing post-discharge instructions  Additional time then spent on discharge activities  Discharge Medications:  See after visit summary for reconciled discharge medications provided to patient and family

## 2018-04-01 NOTE — PROGRESS NOTES
[facility] Progress Note  Patient: Joe Foster 23 y o  female   MRN: [id number]  PCP: Harrison Romero MD  [facility] [address] 218-02 Encounter: [id number]  Date Of Visit: 04/01/18      Assessment/plan  1  Sepsis due to obstructive left pyelonephritis- appreciate ID recommendations  Changed from ceftriaxone to ancef  No further fevers  Likely can change pt to po keflex and d/c home  Will discuss with ID       2  Nephrolithiasis- s/p cysto with stent placement  Follow up outpt with Dr Zuri Garcia     3  violeta- resolved      4  Acute respiratory failure after procedure- due to atelectasis- since resolved with incentive spirometry     5  Hypokalemia- replaced    dispo- d/c to home    Subjective:   Pt seen and examined  Pt doing well  No further fevers over night  No cp no sob no n/v/d no abd pain  Objective:     Vitals: Blood pressure 135/82, pulse 83, temperature 98 7 °F (37 1 °C), temperature source Temporal, resp  rate 18, last menstrual period 03/07/2018, SpO2 96 %  ,There is no height or weight on file to calculate BMI  Lab, Imaging and other studies:    Results from last 7 days  Lab Units 03/31/18  0555  03/28/18  2225   WBC Thousand/uL 6 87  < > 17 51*   HEMOGLOBIN g/dL 10 6*  < > 12 3   HEMATOCRIT % 31 5*  < > 35 6   PLATELETS Thousands/uL 186  < > 211   INR   --   --  1 27*   < > = values in this interval not displayed  Results from last 7 days  Lab Units 03/31/18  0555  03/28/18  2225   SODIUM mmol/L 136  < > 132*   POTASSIUM mmol/L 3 0*  < > 3 6   CHLORIDE mmol/L 103  < > 98*   CO2 mmol/L 22  < > 20*   BUN mg/dL 11  < > 13   CREATININE mg/dL 1 02  < > 1 43*   CALCIUM mg/dL 8 5  < > 8 8   TOTAL PROTEIN g/dL  --   --  7 7   BILIRUBIN TOTAL mg/dL  --   --  0 60   ALK PHOS U/L  --   --  65   ALT U/L  --   --  23   AST U/L  --   --  16   GLUCOSE RANDOM mg/dL 94  < > 147*   < > = values in this interval not displayed        Lab Results   Component Value Date    BLOODCX No Growth at 50 hrs  03/29/2018    BLOODCX No Growth at 48 hrs  03/29/2018    BLOODCX No Growth at 72 hrs  03/28/2018    URINECX 50,000-59,000 cfu/ml Proteus mirabilis (A) 03/28/2018       Scheduled Meds:   Current Facility-Administered Medications:  acetaminophen 650 mg Oral Q4H PRN Olvin Corrigan MD    cefazolin 1,000 mg Intravenous Q8H Rose Maher MD Last Rate: Stopped (04/01/18 0508)   heparin (porcine) 5,000 Units Subcutaneous Q8H [address] Adrienne Bennett DO    ondansetron 4 mg Intravenous Q4H PRN Mallika Garcia MD    oxyCODONE-acetaminophen 1 tablet Oral Q4H PRN Olvin Corrigan MD    zolpidem 5 mg Oral HS PRN Olvin Corrigan MD      Continuous Infusions:    PRN Meds:   acetaminophen    ondansetron    oxyCODONE-acetaminophen    zolpidem      Physical exam:  Physical Exam  General appearance: alert and oriented, in no acute distress  Head: Normocephalic, without obvious abnormality, atraumatic  Eyes: conjunctivae/corneas clear  PERRL, EOM's intact  Fundi benign    Neck: no adenopathy, no carotid bruit, no JVD, supple, symmetrical, trachea midline and thyroid not enlarged, symmetric, no tenderness/mass/nodules  Lungs: clear to auscultation bilaterally  Heart: regular rate and rhythm, S1, S2 normal, no murmur, click, rub or gallop  Abdomen: soft, non-tender; bowel sounds normal; no masses,  no organomegaly  Extremities: extremities normal, warm and well-perfused; no cyanosis, clubbing, or edema  Pulses: 2+ and symmetric  Skin: Skin color, texture, turgor normal  No rashes or lesions  Neurologic: Grossly normal

## 2018-04-03 LAB
BACTERIA BLD CULT: NORMAL

## 2018-04-04 ENCOUNTER — PREP FOR PROCEDURE (OUTPATIENT)
Dept: UROLOGY | Facility: CLINIC | Age: 23
End: 2018-04-04

## 2018-04-04 ENCOUNTER — TELEPHONE (OUTPATIENT)
Dept: UROLOGY | Facility: CLINIC | Age: 23
End: 2018-04-04

## 2018-04-04 VITALS — WEIGHT: 149.91 LBS | BODY MASS INDEX: 23.48 KG/M2

## 2018-04-04 DIAGNOSIS — N20.0 LEFT NEPHROLITHIASIS: Primary | ICD-10-CM

## 2018-05-16 ENCOUNTER — APPOINTMENT (OUTPATIENT)
Dept: LAB | Facility: HOSPITAL | Age: 23
End: 2018-05-16
Attending: UROLOGY
Payer: COMMERCIAL

## 2018-05-16 ENCOUNTER — OFFICE VISIT (OUTPATIENT)
Dept: OBGYN CLINIC | Facility: MEDICAL CENTER | Age: 23
End: 2018-05-16
Payer: COMMERCIAL

## 2018-05-16 VITALS — BODY MASS INDEX: 24.12 KG/M2 | SYSTOLIC BLOOD PRESSURE: 132 MMHG | DIASTOLIC BLOOD PRESSURE: 78 MMHG | WEIGHT: 154 LBS

## 2018-05-16 DIAGNOSIS — N20.0 NEPHROLITHIASIS: ICD-10-CM

## 2018-05-16 DIAGNOSIS — N83.202 LEFT OVARIAN CYST: Primary | ICD-10-CM

## 2018-05-16 DIAGNOSIS — Z30.011 ORAL CONTRACEPTIVE PRESCRIBED: ICD-10-CM

## 2018-05-16 PROCEDURE — 87086 URINE CULTURE/COLONY COUNT: CPT

## 2018-05-16 PROCEDURE — 99203 OFFICE O/P NEW LOW 30 MIN: CPT | Performed by: OBSTETRICS & GYNECOLOGY

## 2018-05-16 RX ORDER — NORGESTIMATE AND ETHINYL ESTRADIOL 0.25-0.035
1 KIT ORAL DAILY
Qty: 28 TABLET | Refills: 2 | Status: SHIPPED | OUTPATIENT
Start: 2018-05-16 | End: 2018-06-13 | Stop reason: ALTCHOICE

## 2018-05-16 NOTE — PROGRESS NOTES
Assessment/Plan  Evette was seen today for follow-up  Diagnoses and all orders for this visit:    Left ovarian cyst  -     US pelvis complete w transvaginal; Future    Oral contraceptive prescribed  -     norgestimate-ethinyl estradiol (ORTHO-CYCLEN) 0 25-35 MG-MCG per tablet; Take 1 tablet by mouth daily    Patient was diagnosed with left ovarian cyst incidentally at time of CT scan which showed an obstructing stone  Patient states she since has had a stent placed and had not had any more pain  We discussed CT scan findings and recommend US for follow up  Suspicion that this was hemorraghic cyst that would likely resolve  We also discussed birth control in prevention of other cyst but not treatment of current cyst  States she had been on OCP for several years in high school and never had a problem with it  Subjective   Luda Oviedo is a 23 y o  female here for a problem visit  Present for follow up as referred by PCP for cyst found on CT scan  States currently not having any pain since she had a renal stent placed for obstructing stone  Also states interesting in restarting OCP   Patient Active Problem List   Diagnosis    Sepsis ([address] )    Acute pyelonephritis    Ureteral stone    Left nephrolithiasis       Gynecologic History  Patient's last menstrual period was 04/25/2018 (exact date)  History reviewed  No pertinent past medical history  Past Surgical History:   Procedure Laterality Date    OR CYSTOURETHROSCOPY,URETER CATHETER Left 3/29/2018    Procedure: CYSTOSCOPY RETROGRADE PYELOGRAM WITH INSERTION STENT URETERAL;  Surgeon: Ramiro Santoyo MD;  Location: [facility] OR;  Service: Urology     Family History   Problem Relation Age of Onset    No Known Problems Mother     No Known Problems Father      Social History     Social History    Marital status: Single     Spouse name: N/A    Number of children: N/A    Years of education: N/A     Occupational History    Not on file       Social History Main Topics    Smoking status: Never Smoker    Smokeless tobacco: Never Used    Alcohol use Yes      Comment: occasionally    Drug use: No    Sexual activity: Yes     Partners: Male     Other Topics Concern    Not on file     Social History Narrative    No narrative on file     Allergies   Allergen Reactions    Shellfish-Derived Products Hives       Current Outpatient Prescriptions:     norgestimate-ethinyl estradiol (ORTHO-CYCLEN) 0 25-35 MG-MCG per tablet, Take 1 tablet by mouth daily, Disp: 28 tablet, Rfl: 2    Review of Systems  Constitutional :no fever, feels well, no tiredness, no recent weight gain or loss  ENT: no ear ache, no loss of hearing, no nosebleeds or nasal discharge, no sore throat or hoarseness  Cardiovascular: no complaints of slow or fast heart beat, no chest pain, no palpitations, no leg claudication or lower extremity edema  Respiratory: no complaints of shortness of shortness of breath, no TERRAZAS  Breasts:no complaints of breast pain, breast lump, or nipple discharge  Gastrointestinal: no complaints of abdominal pain, constipation, nausea, vomiting, or diarrhea or bloody stools  Genitourinary : no complaints of dysuria, incontinence, pelvic pain, no dysmenorrhea, vaginal discharge or abnormal vaginal bleeding and as noted in HPI  Musculoskeletal: no complaints of arthralgia, no myalgia, no joint swelling or stiffness, no limb pain or swelling  Integumentary: no complaints of skin rash or lesion, itching or dry skin  Neurological: no complaints of headache, no confusion, no numbness or tingling, no dizziness or fainting     Objective     /78   Wt 69 9 kg (154 lb)   LMP 04/25/2018 (Exact Date)   Breastfeeding?  No   BMI 24 12 kg/m²     General:   appears stated age, cooperative, alert normal mood and affect   Heart: regular rate and rhythm, S1, S2 normal, no murmur, click, rub or gallop   Lungs: clear to auscultation bilaterally   Psychiatric orientation to person, place, and time: normal  mood and affect: normal

## 2018-05-17 LAB — BACTERIA UR CULT: NORMAL

## 2018-05-21 ENCOUNTER — OFFICE VISIT (OUTPATIENT)
Dept: UROLOGY | Facility: CLINIC | Age: 23
End: 2018-05-21
Payer: COMMERCIAL

## 2018-05-21 VITALS
SYSTOLIC BLOOD PRESSURE: 124 MMHG | BODY MASS INDEX: 23.54 KG/M2 | HEIGHT: 67 IN | WEIGHT: 150 LBS | HEART RATE: 80 BPM | DIASTOLIC BLOOD PRESSURE: 80 MMHG

## 2018-05-21 DIAGNOSIS — N20.0 LEFT NEPHROLITHIASIS: Primary | ICD-10-CM

## 2018-05-21 DIAGNOSIS — N20.1 URETERAL STONE: ICD-10-CM

## 2018-05-21 PROCEDURE — 99214 OFFICE O/P EST MOD 30 MIN: CPT | Performed by: UROLOGY

## 2018-05-21 NOTE — PROGRESS NOTES
UROLOGY NEW CONSULT NOTE     CHIEF COMPLAINT   Ángel Johnson is a 23 y o  female with a complaint of   Chief Complaint   Patient presents with    Nephrolithiasis     H&P for Surgery (5/24/18)       History of Present Illness:     23 y o  female with no prior stone history  Patient previously presented to the emergency room with proximal left obstructing stones in March  She was stented at that time  The patient elected to avoid stone treatment at that time instead preferring to finish out her school year  She is now home for the summer and presents for history and physical for her upcoming ureteroscopy  She has tolerated her stent reasonably well only requiring occasional Advil  Past Medical History:   History reviewed  No pertinent past medical history  PAST SURGICAL HISTORY:     Past Surgical History:   Procedure Laterality Date    [place] CYSTOURETHROSCOPY,URETER CATHETER Left 3/29/2018    Procedure: CYSTOSCOPY RETROGRADE PYELOGRAM WITH INSERTION STENT URETERAL;  Surgeon: Jamey Peraza MD;  Location: [facility];  Service: Urology       CURRENT MEDICATIONS:     Current Outpatient Prescriptions   Medication Sig Dispense Refill    norgestimate-ethinyl estradiol (ORTHO-CYCLEN) 0 25-35 MG-MCG per tablet Take 1 tablet by mouth daily 28 tablet 2     No current facility-administered medications for this visit          ALLERGIES:     Allergies   Allergen Reactions    Shellfish-Derived Products Hives       SOCIAL HISTORY:     Social History     Social History    Marital status: Single     Spouse name: N/A    Number of children: N/A    Years of education: N/A     Social History Main Topics    Smoking status: Never Smoker    Smokeless tobacco: Never Used    Alcohol use Yes      Comment: occasionally    Drug use: No    Sexual activity: Yes     Partners: Male     Other Topics Concern    None     Social History Narrative    None       SOCIAL HISTORY:     Family History   Problem Relation Age of Onset    No Known Problems Mother     No Known Problems Father        REVIEW OF SYSTEMS:     Review of Systems   Constitutional: Negative  Respiratory: Negative  Cardiovascular: Negative  Gastrointestinal: Negative  Genitourinary: Positive for urgency  Musculoskeletal: Positive for back pain  Neurological: Negative  Psychiatric/Behavioral: Negative  PHYSICAL EXAM:     /80   Pulse 80   Ht 5' 7" (1 702 m)   Wt 68 kg (150 lb)   LMP 04/25/2018 (Exact Date)   BMI 23 49 kg/m²     General:  Healthy appearing female in no acute distress  They have a normal affect  There is not appear to be any gross neurologic defects or abnormalities  HEENT:  Normocephalic, atraumatic  Neck is supple without any palpable lymphadenopathy  Cardiovascular:  Patient has normal palpable distal radial pulses  There is no significant peripheral edema  No JVD is noted  Respiratory:  Patient has unlabored respirations  There is no audible wheeze or rhonchi  Abdomen:  Abdomen is without surgical scars  Abdomen is soft and nontender  There is no tympany  Inguinal and umbilical hernia are not appreciated  Musculoskeletal:  Patient does have significant CVA tenderness in the left flank with palpation or percussion  They full range of motion in all 4 extremities  Strength in all 4 extremities appears congruent  Patient is able to ambulate without assistance or difficulty  Dermatologic:  Patient has no skin abnormalities or rashes        LABS:     CBC:   Lab Results   Component Value Date    WBC 6 87 03/31/2018    HGB 10 6 (L) 03/31/2018    HCT 31 5 (L) 03/31/2018    MCV 89 03/31/2018     03/31/2018       BMP:   Lab Results   Component Value Date    GLUCOSE 94 03/31/2018    CALCIUM 8 5 03/31/2018     03/31/2018    K 3 0 (L) 03/31/2018    CO2 22 03/31/2018     03/31/2018    BUN 11 03/31/2018    CREATININE 1 02 03/31/2018       IMAGING:     3/28/18  CT ABDOMEN AND PELVIS WITH IV CONTRAST     INDICATION:   LUQ/LLQ abd pain +n/v x3d +fever  no hx GI/ surgery      COMPARISON: None      TECHNIQUE:  CT examination of the abdomen and pelvis was performed  Axial, sagittal, and coronal 2D reformatted images were created from the source data and submitted for interpretation      Radiation dose length product (DLP) for this visit:  306 29 mGy-cm   This examination, like all CT scans performed in the [facility], was performed utilizing techniques to minimize radiation dose exposure, including the use of iterative   reconstruction and automated exposure control      IV Contrast:  100 mL of iohexol (OMNIPAQUE)  Enteric Contrast:  Enteric contrast was not administered      FINDINGS:     ABDOMEN     LOWER CHEST:  No clinically significant abnormality identified in the visualized lower chest      LIVER/BILIARY TREE:  Unremarkable      GALLBLADDER:  No calcified gallstones  No pericholecystic inflammatory change      SPLEEN:  Unremarkable      PANCREAS:  Unremarkable      ADRENAL GLANDS:  Unremarkable      KIDNEYS/URETERS:  1 0 x 0 6 x 1 8 cm obstructive stone seen in the proximal left ureter resulting in left hydronephrosis and hydroureter  Just proximal to this in the right ureter A0 0 5 x 0 8 x 0 9 cm stone is also noted  Additionally, multiple   nonobstructing stones are seen in the lower pole of the left kidney largest of which measures 6 mm  There is a delayed left nephrogram and left perinephric stranding      Subcentimeter hypodensity in the interpolar region of the right kidney is too small to characterize however in the absence of other known disease is overwhelmingly statistically likely to be a simple cyst   Otherwise normal right kidney and ureter      STOMACH AND BOWEL:  Unremarkable      APPENDIX:  No findings to suggest appendicitis  Normal appendix is visualized      ABDOMINOPELVIC CAVITY:  No ascites or free intraperitoneal air   No lymphadenopathy      VESSELS: Unremarkable for patient's age      PELVIS     REPRODUCTIVE ORGANS:  5 3 x 3 9 x 4 0 cm multiloculated cystic lesion in the left ovary for which dedicated ultrasound and/or MRI with contrast recommended for further evaluation        URINARY BLADDER:  Unremarkable      ABDOMINAL WALL/INGUINAL REGIONS:  Unremarkable      OSSEOUS STRUCTURES:  No acute fracture or destructive osseous lesion      IMPRESSION:     1 0 x 0 6 x 1 8 cm obstructive stone seen in the proximal left ureter resulting in left hydronephrosis and hydroureter  Just proximal to this in the right ureter A0 0 5 x 0 8 x 0 9 cm stone is also noted  Additionally, multiple nonobstructing stones   are seen in the lower pole of the left kidney largest of which measures 6 mm  There is a delayed left nephrogram and left perinephric stranding      5 3 x 3 9 x 4 0 cm multiloculated cystic lesion in the left ovary for which dedicated ultrasound (urgently, if there is clinical concern for ovarian torsion; if there is no clinical concern, this can be done electively or can be skipped and an MRI can be   performed) and/or elective MRI of the pelvis without and with contrast are recommended for further evaluation           ASSESSMENT:     23 y o  female with left proximal ureteral stone status post prior stent    PLAN:     I discussed with the patient options for stone management  For ureteral stones, I typically recommend ureteroscopy  This surgical technique was described to the patient  Risks and benefits of the procedure were explained which include primarily infection as it relates to obstructed infected urine or potential release of sequestered bacteria in the stone itself  Patient also understands the risk of damage to surrounding structures, subsequent stricture or scar tissue formation, and need for additional procedures    Based on the size and location of the stone, I have quoted the patient that in approximately 20% of cases stone retrieval cannot be performed in the index operation  Often this requires stenting and return to the operating room at a 2nd procedural date  She will proceed to the operating room this Thursday for a cystoscopy, left retrograde pyelogram, ureteroscopy, laser lithotripsy, basket extraction of stone, and replacement of her left stent  I did show her the films  We will attempt to retrieve as much of the lower pole stone is possible  She understands the risks and benefits and has signed informed consent

## 2018-05-22 ENCOUNTER — HOSPITAL ENCOUNTER (OUTPATIENT)
Dept: ULTRASOUND IMAGING | Facility: HOSPITAL | Age: 23
Discharge: HOME | End: 2018-05-22
Attending: OBSTETRICS & GYNECOLOGY
Payer: COMMERCIAL

## 2018-05-22 DIAGNOSIS — N83.202 LEFT OVARIAN CYST: ICD-10-CM

## 2018-05-22 PROCEDURE — 76830 TRANSVAGINAL US NON-OB: CPT

## 2018-05-22 PROCEDURE — 76856 US EXAM PELVIC COMPLETE: CPT

## 2018-05-22 NOTE — PRE-PROCEDURE INSTRUCTIONS
No outpatient prescriptions have been marked as taking for the 5/24/18 encounter [facility] Encounter)

## 2018-05-22 NOTE — PRE-PROCEDURE INSTRUCTIONS
Pre-Surgery Instructions:   Medication Instructions    norgestimate-ethinyl estradiol (ORTHO-CYCLEN) 0 25-35 MG-MCG per tablet Instructed patient per Anesthesia Guidelines

## 2018-05-23 ENCOUNTER — ANESTHESIA EVENT (OUTPATIENT)
Dept: PERIOP | Facility: HOSPITAL | Age: 23
End: 2018-05-23
Payer: COMMERCIAL

## 2018-05-23 ENCOUNTER — APPOINTMENT (OUTPATIENT)
Dept: LAB | Facility: HOSPITAL | Age: 23
End: 2018-05-23

## 2018-05-23 ENCOUNTER — TRANSCRIBE ORDERS (OUTPATIENT)
Dept: ADMINISTRATIVE | Facility: HOSPITAL | Age: 23
End: 2018-05-23

## 2018-05-23 DIAGNOSIS — Z11.1 SCREENING-PULMONARY TB: ICD-10-CM

## 2018-05-23 DIAGNOSIS — Z11.1 SCREENING-PULMONARY TB: Primary | ICD-10-CM

## 2018-05-23 PROCEDURE — 86480 TB TEST CELL IMMUN MEASURE: CPT

## 2018-05-23 PROCEDURE — 36415 COLL VENOUS BLD VENIPUNCTURE: CPT

## 2018-05-24 ENCOUNTER — HOSPITAL ENCOUNTER (OUTPATIENT)
Facility: HOSPITAL | Age: 23
Setting detail: OUTPATIENT SURGERY
Discharge: HOME | End: 2018-05-24
Attending: UROLOGY | Admitting: UROLOGY
Payer: COMMERCIAL

## 2018-05-24 ENCOUNTER — APPOINTMENT (OUTPATIENT)
Dept: RADIOLOGY | Facility: HOSPITAL | Age: 23
End: 2018-05-24
Payer: COMMERCIAL

## 2018-05-24 ENCOUNTER — ANESTHESIA (OUTPATIENT)
Dept: PERIOP | Facility: HOSPITAL | Age: 23
End: 2018-05-24
Payer: COMMERCIAL

## 2018-05-24 VITALS
DIASTOLIC BLOOD PRESSURE: 61 MMHG | TEMPERATURE: 98.1 F | RESPIRATION RATE: 16 BRPM | HEART RATE: 61 BPM | SYSTOLIC BLOOD PRESSURE: 117 MMHG | OXYGEN SATURATION: 99 %

## 2018-05-24 DIAGNOSIS — N20.0 LEFT NEPHROLITHIASIS: ICD-10-CM

## 2018-05-24 LAB — EXT PREGNANCY TEST URINE: NEGATIVE

## 2018-05-24 PROCEDURE — 52356 CYSTO/URETERO W/LITHOTRIPSY: CPT | Performed by: UROLOGY

## 2018-05-24 PROCEDURE — 81025 URINE PREGNANCY TEST: CPT | Performed by: NURSE ANESTHETIST, CERTIFIED REGISTERED

## 2018-05-24 PROCEDURE — 87086 URINE CULTURE/COLONY COUNT: CPT | Performed by: UROLOGY

## 2018-05-24 PROCEDURE — 87186 SC STD MICRODIL/AGAR DIL: CPT | Performed by: UROLOGY

## 2018-05-24 PROCEDURE — C1758 CATHETER, URETERAL: HCPCS | Performed by: UROLOGY

## 2018-05-24 PROCEDURE — 74450 X-RAY URETHRA/BLADDER: CPT

## 2018-05-24 PROCEDURE — C1894 INTRO/SHEATH, NON-LASER: HCPCS | Performed by: UROLOGY

## 2018-05-24 PROCEDURE — C2617 STENT, NON-COR, TEM W/O DEL: HCPCS | Performed by: UROLOGY

## 2018-05-24 PROCEDURE — 87077 CULTURE AEROBIC IDENTIFY: CPT | Performed by: UROLOGY

## 2018-05-24 PROCEDURE — C1769 GUIDE WIRE: HCPCS | Performed by: UROLOGY

## 2018-05-24 PROCEDURE — 82360 CALCULUS ASSAY QUANT: CPT | Performed by: UROLOGY

## 2018-05-24 DEVICE — STENT URETERAL 6 FR 26CM INLAY OPTIMA: Type: IMPLANTABLE DEVICE | Status: FUNCTIONAL

## 2018-05-24 RX ORDER — MAGNESIUM HYDROXIDE 1200 MG/15ML
LIQUID ORAL AS NEEDED
Status: DISCONTINUED | OUTPATIENT
Start: 2018-05-24 | End: 2018-05-24 | Stop reason: HOSPADM

## 2018-05-24 RX ORDER — FENTANYL CITRATE 50 UG/ML
INJECTION, SOLUTION INTRAMUSCULAR; INTRAVENOUS AS NEEDED
Status: DISCONTINUED | OUTPATIENT
Start: 2018-05-24 | End: 2018-05-24 | Stop reason: SURG

## 2018-05-24 RX ORDER — HYDROCODONE BITARTRATE AND ACETAMINOPHEN 5; 325 MG/1; MG/1
1 TABLET ORAL EVERY 6 HOURS PRN
Status: DISCONTINUED | OUTPATIENT
Start: 2018-05-24 | End: 2018-05-24 | Stop reason: HOSPADM

## 2018-05-24 RX ORDER — ONDANSETRON 2 MG/ML
INJECTION INTRAMUSCULAR; INTRAVENOUS AS NEEDED
Status: DISCONTINUED | OUTPATIENT
Start: 2018-05-24 | End: 2018-05-24 | Stop reason: SURG

## 2018-05-24 RX ORDER — HYDROCODONE BITARTRATE AND ACETAMINOPHEN 5; 325 MG/1; MG/1
1 TABLET ORAL EVERY 6 HOURS PRN
Qty: 15 TABLET | Refills: 0 | Status: SHIPPED | OUTPATIENT
Start: 2018-05-24 | End: 2018-06-13 | Stop reason: ALTCHOICE

## 2018-05-24 RX ORDER — LIDOCAINE HYDROCHLORIDE 10 MG/ML
INJECTION, SOLUTION INFILTRATION; PERINEURAL AS NEEDED
Status: DISCONTINUED | OUTPATIENT
Start: 2018-05-24 | End: 2018-05-24 | Stop reason: SURG

## 2018-05-24 RX ORDER — CEFUROXIME AXETIL 500 MG/1
500 TABLET ORAL EVERY 12 HOURS SCHEDULED
Qty: 6 TABLET | Refills: 0 | Status: SHIPPED | OUTPATIENT
Start: 2018-05-24 | End: 2018-05-27

## 2018-05-24 RX ORDER — TAMSULOSIN HYDROCHLORIDE 0.4 MG/1
0.4 CAPSULE ORAL ONCE
Status: COMPLETED | OUTPATIENT
Start: 2018-05-24 | End: 2018-05-24

## 2018-05-24 RX ORDER — SODIUM CHLORIDE, SODIUM LACTATE, POTASSIUM CHLORIDE, CALCIUM CHLORIDE 600; 310; 30; 20 MG/100ML; MG/100ML; MG/100ML; MG/100ML
125 INJECTION, SOLUTION INTRAVENOUS CONTINUOUS
Status: DISCONTINUED | OUTPATIENT
Start: 2018-05-24 | End: 2018-05-24 | Stop reason: HOSPADM

## 2018-05-24 RX ORDER — KETOROLAC TROMETHAMINE 30 MG/ML
INJECTION, SOLUTION INTRAMUSCULAR; INTRAVENOUS AS NEEDED
Status: DISCONTINUED | OUTPATIENT
Start: 2018-05-24 | End: 2018-05-24 | Stop reason: SURG

## 2018-05-24 RX ORDER — PHENAZOPYRIDINE HYDROCHLORIDE 100 MG/1
100 TABLET, FILM COATED ORAL ONCE
Status: COMPLETED | OUTPATIENT
Start: 2018-05-24 | End: 2018-05-24

## 2018-05-24 RX ORDER — LIDOCAINE HYDROCHLORIDE 20 MG/ML
JELLY TOPICAL AS NEEDED
Status: DISCONTINUED | OUTPATIENT
Start: 2018-05-24 | End: 2018-05-24 | Stop reason: HOSPADM

## 2018-05-24 RX ORDER — ONDANSETRON 2 MG/ML
4 INJECTION INTRAMUSCULAR; INTRAVENOUS ONCE AS NEEDED
Status: DISCONTINUED | OUTPATIENT
Start: 2018-05-24 | End: 2018-05-24 | Stop reason: HOSPADM

## 2018-05-24 RX ORDER — PROPOFOL 10 MG/ML
INJECTION, EMULSION INTRAVENOUS AS NEEDED
Status: DISCONTINUED | OUTPATIENT
Start: 2018-05-24 | End: 2018-05-24 | Stop reason: SURG

## 2018-05-24 RX ORDER — MIDAZOLAM HYDROCHLORIDE 1 MG/ML
INJECTION INTRAMUSCULAR; INTRAVENOUS AS NEEDED
Status: DISCONTINUED | OUTPATIENT
Start: 2018-05-24 | End: 2018-05-24 | Stop reason: SURG

## 2018-05-24 RX ORDER — PHENAZOPYRIDINE HYDROCHLORIDE 100 MG/1
100 TABLET, FILM COATED ORAL 3 TIMES DAILY PRN
Qty: 10 TABLET | Refills: 0 | Status: SHIPPED | OUTPATIENT
Start: 2018-05-24 | End: 2018-06-13 | Stop reason: ALTCHOICE

## 2018-05-24 RX ORDER — MEPERIDINE HYDROCHLORIDE 25 MG/ML
12.5 INJECTION INTRAMUSCULAR; INTRAVENOUS; SUBCUTANEOUS ONCE AS NEEDED
Status: DISCONTINUED | OUTPATIENT
Start: 2018-05-24 | End: 2018-05-24 | Stop reason: HOSPADM

## 2018-05-24 RX ORDER — FENTANYL CITRATE/PF 50 MCG/ML
25 SYRINGE (ML) INJECTION
Status: DISCONTINUED | OUTPATIENT
Start: 2018-05-24 | End: 2018-05-24 | Stop reason: HOSPADM

## 2018-05-24 RX ORDER — TAMSULOSIN HYDROCHLORIDE 0.4 MG/1
0.4 CAPSULE ORAL
Qty: 7 CAPSULE | Refills: 0 | Status: SHIPPED | OUTPATIENT
Start: 2018-05-24 | End: 2018-06-13 | Stop reason: ALTCHOICE

## 2018-05-24 RX ORDER — DOCUSATE SODIUM 100 MG/1
100 CAPSULE, LIQUID FILLED ORAL 2 TIMES DAILY
Qty: 60 CAPSULE | Refills: 0 | Status: SHIPPED | OUTPATIENT
Start: 2018-05-24 | End: 2018-06-13 | Stop reason: ALTCHOICE

## 2018-05-24 RX ADMIN — GENTAMICIN SULFATE 310 MG: 40 INJECTION, SOLUTION INTRAMUSCULAR; INTRAVENOUS at 10:14

## 2018-05-24 RX ADMIN — SODIUM CHLORIDE, POTASSIUM CHLORIDE, SODIUM LACTATE AND CALCIUM CHLORIDE 125 ML/HR: 600; 310; 30; 20 INJECTION, SOLUTION INTRAVENOUS at 08:49

## 2018-05-24 RX ADMIN — LIDOCAINE HYDROCHLORIDE 50 MG: 10 INJECTION, SOLUTION INFILTRATION; PERINEURAL at 11:31

## 2018-05-24 RX ADMIN — SODIUM CHLORIDE, POTASSIUM CHLORIDE, SODIUM LACTATE AND CALCIUM CHLORIDE: 600; 310; 30; 20 INJECTION, SOLUTION INTRAVENOUS at 12:54

## 2018-05-24 RX ADMIN — FENTANYL CITRATE 50 MCG: 50 INJECTION, SOLUTION INTRAMUSCULAR; INTRAVENOUS at 11:31

## 2018-05-24 RX ADMIN — PROPOFOL 200 MG: 10 INJECTION, EMULSION INTRAVENOUS at 11:31

## 2018-05-24 RX ADMIN — ONDANSETRON HYDROCHLORIDE 4 MG: 2 INJECTION, SOLUTION INTRAVENOUS at 11:39

## 2018-05-24 RX ADMIN — MIDAZOLAM HYDROCHLORIDE 2 MG: 1 INJECTION, SOLUTION INTRAMUSCULAR; INTRAVENOUS at 11:25

## 2018-05-24 RX ADMIN — AMPICILLIN SODIUM: 1 INJECTION, POWDER, FOR SOLUTION INTRAMUSCULAR; INTRAVENOUS at 11:34

## 2018-05-24 RX ADMIN — AMPICILLIN SODIUM 1000 MG: 1 INJECTION, POWDER, FOR SOLUTION INTRAMUSCULAR; INTRAVENOUS at 11:37

## 2018-05-24 RX ADMIN — GENTAMICIN SULFATE 310 MG: 40 INJECTION, SOLUTION INTRAMUSCULAR; INTRAVENOUS at 10:20

## 2018-05-24 RX ADMIN — TAMSULOSIN HYDROCHLORIDE 0.4 MG: 0.4 CAPSULE ORAL at 13:40

## 2018-05-24 RX ADMIN — KETOROLAC TROMETHAMINE 30 MG: 30 INJECTION, SOLUTION INTRAMUSCULAR at 12:50

## 2018-05-24 RX ADMIN — DEXAMETHASONE SODIUM PHOSPHATE 4 MG: 10 INJECTION INTRAMUSCULAR; INTRAVENOUS at 11:39

## 2018-05-24 RX ADMIN — FENTANYL CITRATE 50 MCG: 50 INJECTION, SOLUTION INTRAMUSCULAR; INTRAVENOUS at 13:00

## 2018-05-24 RX ADMIN — PHENAZOPYRIDINE HYDROCHLORIDE 100 MG: 100 TABLET ORAL at 13:41

## 2018-05-24 NOTE — ANESTHESIA POSTPROCEDURE EVALUATION
Post-Op Assessment Note      CV Status:  Stable    Mental Status:  Alert and awake    Hydration Status:  Euvolemic    PONV Controlled:  Controlled    Airway Patency:  Patent    Post Op Vitals Reviewed: Yes          Staff: CRNA           BP   129/73   Temp   97 5   Pulse  99   Resp   16   SpO2   100%

## 2018-05-24 NOTE — DISCHARGE INSTRUCTIONS
You have stent on a string, which will be removed in 3-5 days  Please take care not to remove with dressing or undressing  Our office staff will contact you to arrange an appointment for removal     Ureteroscopy   WHAT YOU NEED TO KNOW:   A ureteroscopy is a procedure to examine in the inside of your urinary tract, which includes your urethra, bladder, ureters, and kidneys  A ureteroscope is a small, thin tube with a light and camera on the end  Ureteroscopy can help your healthcare provider diagnose and treat problems in your urinary tract, such as kidney stones  DISCHARGE INSTRUCTIONS:   Medicine:   · Antibiotics  may be given to treat or prevent an infection  · Take your medicine as directed  Contact your healthcare provider if you think your medicine is not helping or if you have side effects  Tell him or her if you are allergic to any medicine  Keep a list of the medicines, vitamins, and herbs you take  Include the amounts, and when and why you take them  Bring the list or the pill bottles to follow-up visits  Carry your medicine list with you in case of an emergency  Follow up with your healthcare provider as directed:  Write down your questions so you remember to ask them during your visits  Drink liquids as directed  Liquids can help prevent kidney stones and urinary tract infections  Drink water and limit the amount of caffeine you drink  Caffeine may be found in coffee, tea, soda, sports drinks, and foods  Ask your healthcare provider how much liquid to drink each day  Contact your healthcare provider if:   · You have a fever  · You cannot urinate  · You have blood in your urine  · You are vomiting  · You have pain in your abdomen or side  · You have questions or concerns about your condition or care  © 2017 [employer] Information is for End User's use only and may not be sold, redistributed or otherwise used for commercial purposes   All illustrations and images included in CareNotes® are the copyrighted property of [employer]  or [employer]  The above information is an  only  It is not intended as medical advice for individual conditions or treatments  Talk to your doctor, nurse or pharmacist before following any medical regimen to see if it is safe and effective for you

## 2018-05-24 NOTE — H&P (VIEW-ONLY)
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;  Service: Urology       CURRENT MEDICATIONS:     Current Outpatient Prescriptions   Medication Sig Dispense Refill    norgestimate-ethinyl estradiol (ORTHO-CYCLEN) 0 25-35 MG-MCG per tablet Take 1 tablet by mouth daily 28 tablet 2     No current facility-administered medications for this visit          ALLERGIES:     Allergies   Allergen Reactions    Shellfish-Derived Products Hives       SOCIAL HISTORY:     Social History     Social History    Marital status: Single     Spouse name: N/A    Number of children: N/A    Years of education: N/A     Social History Main Topics    Smoking status: Never Smoker    Smokeless tobacco: Never Used    Alcohol use Yes      Comment: occasionally    Drug use: No    Sexual activity: Yes     Partners: Male     Other Topics Concern    None     Social History Narrative    None       SOCIAL HISTORY:     Family History   Problem Relation Age of Onset    No Known Problems Mother     No Known Problems Father        REVIEW OF SYSTEMS:     Review of Systems   Constitutional: Negative  Respiratory: Negative  Cardiovascular: Negative  Gastrointestinal: Negative  Genitourinary: Positive for urgency  Musculoskeletal: Positive for back pain  Neurological: Negative  Psychiatric/Behavioral: Negative  PHYSICAL EXAM:     /80   Pulse 80   Ht 5' 7" (1 702 m)   Wt 68 kg (150 lb)   LMP 04/25/2018 (Exact Date)   BMI 23 49 kg/m²     General:  Healthy appearing female in no acute distress  They have a normal affect  There is not appear to be any gross neurologic defects or abnormalities  HEENT:  Normocephalic, atraumatic  Neck is supple without any palpable lymphadenopathy  Cardiovascular:  Patient has normal palpable distal radial pulses  There is no significant peripheral edema  No JVD is noted  Respiratory:  Patient has unlabored respirations  There is no audible wheeze or rhonchi  Abdomen:  Abdomen is without surgical scars  Abdomen is soft and nontender  There is no tympany  Inguinal and umbilical hernia are not appreciated  Musculoskeletal:  Patient does have significant CVA tenderness in the left flank with palpation or percussion  They full range of motion in all 4 extremities  Strength in all 4 extremities appears congruent  Patient is able to ambulate without assistance or difficulty  Dermatologic:  Patient has no skin abnormalities or rashes        LABS:     CBC:   Lab Results   Component Value Date    WBC 6 87 03/31/2018    HGB 10 6 (L) 03/31/2018    HCT 31 5 (L) 03/31/2018    MCV 89 03/31/2018     03/31/2018       BMP:   Lab Results   Component Value Date    GLUCOSE 94 03/31/2018    CALCIUM 8 5 03/31/2018     03/31/2018    K 3 0 (L) 03/31/2018    CO2 22 03/31/2018     03/31/2018    BUN 11 03/31/2018    CREATININE 1 02 03/31/2018       IMAGING:     3/28/18  CT ABDOMEN AND PELVIS WITH IV CONTRAST     INDICATION:   LUQ/LLQ abd pain +n/v x3d +fever  no hx GI/ surgery      COMPARISON: None      TECHNIQUE:  CT examination of the abdomen and pelvis was performed  Axial, sagittal, and coronal 2D reformatted images were created from the source data and submitted for interpretation      Radiation dose length product (DLP) for this visit:  306 29 mGy-cm   This examination, like all CT scans performed in the [facility], was performed utilizing techniques to minimize radiation dose exposure, including the use of iterative   reconstruction and automated exposure control      IV Contrast:  100 mL of iohexol (OMNIPAQUE)  Enteric Contrast:  Enteric contrast was not administered      FINDINGS:     ABDOMEN     LOWER CHEST:  No clinically significant abnormality identified in the visualized lower chest      LIVER/BILIARY TREE:  Unremarkable      GALLBLADDER:  No calcified gallstones  No pericholecystic inflammatory change      SPLEEN:  Unremarkable      PANCREAS:  Unremarkable      ADRENAL GLANDS:  Unremarkable      KIDNEYS/URETERS:  1 0 x 0 6 x 1 8 cm obstructive stone seen in the proximal left ureter resulting in left hydronephrosis and hydroureter  Just proximal to this in the right ureter A0 0 5 x 0 8 x 0 9 cm stone is also noted  Additionally, multiple   nonobstructing stones are seen in the lower pole of the left kidney largest of which measures 6 mm  There is a delayed left nephrogram and left perinephric stranding      Subcentimeter hypodensity in the interpolar region of the right kidney is too small to characterize however in the absence of other known disease is overwhelmingly statistically likely to be a simple cyst   Otherwise normal right kidney and ureter      STOMACH AND BOWEL:  Unremarkable      APPENDIX:  No findings to suggest appendicitis  Normal appendix is visualized      ABDOMINOPELVIC CAVITY:  No ascites or free intraperitoneal air   No lymphadenopathy      VESSELS: Unremarkable for patient's age      PELVIS     REPRODUCTIVE ORGANS:  5 3 x 3 9 x 4 0 cm multiloculated cystic lesion in the left ovary for which dedicated ultrasound and/or MRI with contrast recommended for further evaluation        URINARY BLADDER:  Unremarkable      ABDOMINAL WALL/INGUINAL REGIONS:  Unremarkable      OSSEOUS STRUCTURES:  No acute fracture or destructive osseous lesion      IMPRESSION:     1 0 x 0 6 x 1 8 cm obstructive stone seen in the proximal left ureter resulting in left hydronephrosis and hydroureter  Just proximal to this in the right ureter A0 0 5 x 0 8 x 0 9 cm stone is also noted  Additionally, multiple nonobstructing stones   are seen in the lower pole of the left kidney largest of which measures 6 mm  There is a delayed left nephrogram and left perinephric stranding      5 3 x 3 9 x 4 0 cm multiloculated cystic lesion in the left ovary for which dedicated ultrasound (urgently, if there is clinical concern for ovarian torsion; if there is no clinical concern, this can be done electively or can be skipped and an MRI can be   performed) and/or elective MRI of the pelvis without and with contrast are recommended for further evaluation           ASSESSMENT:     23 y o  female with left proximal ureteral stone status post prior stent    PLAN:     I discussed with the patient options for stone management  For ureteral stones, I typically recommend ureteroscopy  This surgical technique was described to the patient  Risks and benefits of the procedure were explained which include primarily infection as it relates to obstructed infected urine or potential release of sequestered bacteria in the stone itself  Patient also understands the risk of damage to surrounding structures, subsequent stricture or scar tissue formation, and need for additional procedures    Based on the size and location of the stone, I have quoted the patient that in approximately 20% of cases stone retrieval cannot be performed in the index operation  Often this requires stenting and return to the operating room at a 2nd procedural date  She will proceed to the operating room this Thursday for a cystoscopy, left retrograde pyelogram, ureteroscopy, laser lithotripsy, basket extraction of stone, and replacement of her left stent  I did show her the films  We will attempt to retrieve as much of the lower pole stone is possible  She understands the risks and benefits and has signed informed consent

## 2018-05-24 NOTE — OP NOTE
OPERATIVE REPORT  PATIENT NAME: Mahendra Reynolds    :  1995  MRN: [id number]  Pt Location: [facility] ROOM 02    SURGERY DATE: 2018    Surgeon(s) and Role:     * Jessie Gamble MD - Primary    Preop Diagnosis:  Left nephrolithiasis [N20 0]    Post-Op Diagnosis Codes:     * Left nephrolithiasis [N20 0]    Procedure(s) (LRB):  CYSTOSCOPY URETEROSCOPY WITH LITHOTRIPSY HOLMIUM LASER, RETROGRADE PYELOGRAM AND INSERTION STENT URETERAL BASKET STONE EXTRACTION (Left)    Specimen(s):  ID Type Source Tests Collected by Time Destination   A :  Calculus Ureter, Left STONE ANALYSIS Jessie Gamble MD 2018 1211    B : left renal pelvis urine Urine Urine, Renal, Left URINE CULTURE Jessie Gamble MD 2018 1217        Estimated Blood Loss:   Minimal    Drains:  Ureteral Drain/Stent Left ureter 6 Fr  (Active)   Number of days: 64   6x24 LEFT with tether    Anesthesia Type:   General    Operative Indications:  Left nephrolithiasis [N20 0]      Operative Findings:  1  Proximal filling defect from ureteral stone  2  Periureteral inflammation surrounding stone  3  Good laser ablation of ureteral stone  4  Significant mucous, martix and debris in left renal pelvis  5  No visible stones in renal pelvis or ureter at case completion  6  Good placement of LEFT stent    Complications:   None    Procedure and Technique:  Mahendra Reynolds is a 23y o -year-old female  with a history of new onset stone  Patient presented and was stented on the left side in March  The patient delayed definitive stone treatment so she can complete her semester at school  She presents for definitive management    Risk and benefits of ureteroscopy were discussed and reviewed  Informed consent was obtained  The patient was brought to the operating room on 2018  After the smooth induction of general LMA anesthesia, the patient was placed in the dorsal lithotomy position   Her genitalia was prepped and draped in a sterile fashion  Intravenous antibiotics were administered in the form of ampicillin and gentamicin  Patient had a prior Proteus urinary tract infection in March  A timeout was performed with all members of the operative team confirmed the patient's identity, procedure to be performed, and laterality of the case  A 22 Maori rigid cystoscope with 30° lens was inserted  The bladder was thoroughly inspected  It was no evidence of mucosal abnormalities or lesions  There were no calculi identified  Attention was focused on the left ureteral orifice   flouroscopy demonstrated a non radioopaque stone  A Bard Solo Plus wire was passed alongside the stent and proximal to the stone and secured as a safety  Stent graspers were then used to remove the stent  There was some distal encrustation  A retrograde pyelogram was performed that demonstrated filling defect in the proximal ureter and poor filling of the renal pelvis       A short semirigid ureteroscope was then inserted adjacent to the wire  The stone was identified in the proximal ureter and was engaged and decimated with a 365nm  holmium laser fiber  The fragments were removed with an expand basket  The ureteroscope was then repassed multiple times to ensure that the ureter was free and clear of any residual stones  There was noted to be significant inflammation around the stone  We were able to pass through this up to the level of the renal pelvis  In order to fully inspect the kidney, a 2nd wire was placed through the scope and the scope was removed  A 12/14 space 25 cm ureteral access sheath was placed into the mid ureter  We were then able to introduce a flexible ureteral scope  This was passed into the renal pelvis  We medially encounter a large amount of stone debris and mucus  Visualization was difficult  We copiously irrigated the renal pelvis  Some of the urine was sent off for culture    We utilized a Ambrocio light basket to retrieve large fragments of mucus and matrix type material   Multiple passes were made until visualization improved  In the lower pole, there were some larger conglomeration is a of matrix the stone material   These were grasped, removed, and sent off for analysis  At the completion of the procedure, no additional debris or stones were noted in the renal pelvis or minor calices  A pull-out ureteroscopy was performed confirming no additional ureteral stone burden  We did note the area of the initial ureteral stone which did show signs of inflammation  Once the ureteral scopes were completely removed, a 6 x 24 double-J stent with tether attached was advanced over top of the safety wire  This was placed in good position with good coil in the renal pelvis and a good distal coil in the bladder  The tether was secured to the patient's right thigh with benzoin and Tegaderm  2% viscous lidocaine was placed per urethra  Patient's bladder was emptied, she was taken out of dorsal lithotomy, extubated and transferred to PACU in stable condition  Plan-stent will be removed in 3-5 days  We will follow the intraoperative culture data  I will treat the patient empirically for 5 days based on her prior Proteus urinary tract infection  Will carefully follow her between 2 and 3 months with additional imaging to ensure that she does not recur with stone disease       I was present for the entire procedure    Patient Disposition:  PACU     SIGNATURE: Arlin Giron MD  DATE: May 24, 2018  TIME: 1:24 PM

## 2018-05-24 NOTE — INTERVAL H&P NOTE
H&P reviewed  After examining the patient I find no changes in the patients condition since the H&P had been written  LEFT side marked

## 2018-05-24 NOTE — ANESTHESIA PREPROCEDURE EVALUATION
Review of Systems/Medical History          Cardiovascular  Negative cardio ROS    Pulmonary  Negative pulmonary ROS        GI/Hepatic  Negative GI/hepatic ROS          Kidney stones,        Endo/Other  Negative endo/other ROS      GYN  Negative gynecology ROS          Hematology  Negative hematology ROS      Musculoskeletal  Negative musculoskeletal ROS        Neurology  Negative neurology ROS      Psychology   Negative psychology ROS              Physical Exam    Airway    Mallampati score: II  TM Distance: >3 FB  Neck ROM: full     Dental       Cardiovascular  Comment: Negative ROS, Cardiovascular exam normal    Pulmonary  Pulmonary exam normal     Other Findings        Anesthesia Plan  ASA Score- 1     Anesthesia Type-   Additional Monitors:   Airway Plan: LMA  Plan Factors-    Induction- intravenous  Postoperative Plan-     Informed Consent- Anesthetic plan and risks discussed with patient  I personally reviewed this patient with the CRNA  Discussed and agreed on the Anesthesia Plan with the CRNA  Toshia Brenner

## 2018-05-25 ENCOUNTER — TELEPHONE (OUTPATIENT)
Dept: OBGYN CLINIC | Facility: MEDICAL CENTER | Age: 23
End: 2018-05-25

## 2018-05-25 ENCOUNTER — TELEPHONE (OUTPATIENT)
Dept: UROLOGY | Facility: CLINIC | Age: 23
End: 2018-05-25

## 2018-05-25 DIAGNOSIS — N20.0 NEPHROLITHIASIS: Primary | ICD-10-CM

## 2018-05-25 LAB
ANNOTATION COMMENT IMP: NORMAL
GAMMA INTERFERON BACKGROUND BLD IA-ACNC: 0.08 IU/ML
M TB IFN-G BLD-IMP: NEGATIVE
M TB IFN-G CD4+ BCKGRND COR BLD-ACNC: <0.01 IU/ML
M TB IFN-G CD4+ T-CELLS BLD-ACNC: 0.07 IU/ML
MITOGEN IGNF BLD-ACNC: 8.62 IU/ML
QUANTIFERON-TB GOLD IN TUBE: NORMAL
SERVICE CMNT-IMP: NORMAL

## 2018-05-25 NOTE — TELEPHONE ENCOUNTER
Radiology called to inform you that her u/s is in epic and the radiologist would like you to review the film

## 2018-05-25 NOTE — TELEPHONE ENCOUNTER
Left message for pt to contact office re scheduling of CT scan needed in 2 months  Pt s/p left ureteroscopic stone extraction, left stent insertion 5/24/2018  Received phone call from [facility] office pt pulled out her own stent since she was leaking continuously  Left message for pt, per Dr Claudia Fisher pt needs low dose CT A/P in 2 months, to contact office to schedule

## 2018-05-26 LAB — BACTERIA UR CULT: ABNORMAL

## 2018-06-04 LAB
CA PHOS MFR STONE: 80 %
COLOR STONE: NORMAL
COM MFR STONE: 5 %
COMMENT-STONE3: NORMAL
COMPOSITION: NORMAL
LABORATORY COMMENT REPORT: NORMAL
NIDUS STONE QL: NORMAL
PHOTO: NORMAL
SIZE STONE: NORMAL MM
STONE ANALYSIS-IMP: NORMAL
STONE ANALYSIS-IMP: NORMAL
TRI-PHOS MFR STONE: 15 %
WT STONE: 165.7 MG

## 2018-06-13 ENCOUNTER — OFFICE VISIT (OUTPATIENT)
Dept: FAMILY MEDICINE CLINIC | Facility: CLINIC | Age: 23
End: 2018-06-13
Payer: COMMERCIAL

## 2018-06-13 VITALS
DIASTOLIC BLOOD PRESSURE: 82 MMHG | TEMPERATURE: 98.4 F | RESPIRATION RATE: 13 BRPM | OXYGEN SATURATION: 98 % | SYSTOLIC BLOOD PRESSURE: 119 MMHG | HEART RATE: 83 BPM | HEIGHT: 67 IN | BODY MASS INDEX: 22.91 KG/M2 | WEIGHT: 146 LBS

## 2018-06-13 DIAGNOSIS — Z00.00 ENCOUNTER FOR WELL ADULT EXAM WITHOUT ABNORMAL FINDINGS: Primary | ICD-10-CM

## 2018-06-13 DIAGNOSIS — Z23 NEED FOR VACCINATION TO PREVENT TUBERCULOSIS: ICD-10-CM

## 2018-06-13 PROCEDURE — 86580 TB INTRADERMAL TEST: CPT | Performed by: FAMILY MEDICINE

## 2018-06-13 PROCEDURE — 99395 PREV VISIT EST AGE 18-39: CPT | Performed by: FAMILY MEDICINE

## 2018-06-13 NOTE — PROGRESS NOTES
Assessment/Plan:     Diagnoses and all orders for this visit:    Need for vaccination to prevent tuberculosis  -     TB Skin Test      Discussion/Plan:  Well adult exam without abnormal findings   Need for TB test - tb testing ordered  RTC for TB test reading or sooner if needed  Subjective:      Patient ID: Joe Foster is a 23 y o  female  Chief Complaint   Patient presents with    Establish Care    TB Test       Patient presents to the office today to establish care and for TB testing  She is doing well without any complaints/concerns  She has history of nephrolithiasis, no other medical problems  The following portions of the patient's history were reviewed and updated as appropriate: allergies, current medications, past family history, past medical history, past social history, past surgical history and problem list     Patient Active Problem List   Diagnosis    [facility] ([facility] [address]    Acute pyelonephritis    Ureteral stone    Left nephrolithiasis    Volar retinacular ganglion     Current Outpatient Prescriptions on File Prior to Visit   Medication Sig Dispense Refill    [DISCONTINUED] docusate sodium (COLACE) 100 mg capsule Take 1 capsule (100 mg total) by mouth 2 (two) times a day for 60 doses 60 capsule 0    [DISCONTINUED] HYDROcodone-acetaminophen (NORCO) 5-325 mg per tablet Take 1 tablet by mouth every 6 (six) hours as needed for pain for up to 15 doses Max Daily Amount: 4 tablets 15 tablet 0    [DISCONTINUED] norgestimate-ethinyl estradiol (ORTHO-CYCLEN) 0 25-35 MG-MCG per tablet Take 1 tablet by mouth daily 28 tablet 2    [DISCONTINUED] phenazopyridine (PYRIDIUM) 100 mg tablet Take 1 tablet (100 mg total) by mouth 3 (three) times a day as needed for bladder spasms 10 tablet 0    [DISCONTINUED] tamsulosin (FLOMAX) 0 4 mg Take 1 capsule (0 4 mg total) by mouth daily with dinner for 7 doses 7 capsule 0     No current facility-administered medications on file prior to visit  Review of Systems   Constitutional: Negative  HENT: Negative  Eyes: Negative  Respiratory: Negative  Cardiovascular: Negative  Gastrointestinal: Negative  Endocrine: Negative  Genitourinary: Negative  Musculoskeletal: Negative  Allergic/Immunologic: Negative  Neurological: Negative  Hematological: Negative  Psychiatric/Behavioral: Negative  Objective:      /82 (BP Location: Left arm, Patient Position: Sitting, Cuff Size: Large)   Pulse 83   Temp 98 4 °F (36 9 °C) (Tympanic)   Resp 13   Ht 5' 7" (1 702 m)   Wt 66 2 kg (146 lb)   LMP 05/24/2018 (Exact Date)   SpO2 98%   BMI 22 87 kg/m²          Physical Exam   Constitutional: She is oriented to person, place, and time  She appears well-developed and well-nourished  HENT:   Head: Normocephalic and atraumatic  Right Ear: Tympanic membrane, external ear and ear canal normal    Left Ear: Tympanic membrane, external ear and ear canal normal    Mouth/Throat: Oropharynx is clear and moist    Eyes: Conjunctivae and EOM are normal  Pupils are equal, round, and reactive to light  Neck: Normal range of motion  Neck supple  No JVD present  No tracheal deviation present  No thyromegaly present  Cardiovascular: Normal rate, regular rhythm and normal heart sounds  Pulmonary/Chest: Effort normal and breath sounds normal  No stridor  Abdominal: Soft  Bowel sounds are normal  She exhibits no distension and no mass  There is no tenderness  There is no rebound and no guarding  Musculoskeletal: Normal range of motion  Lymphadenopathy:     She has no cervical adenopathy  Neurological: She is alert and oriented to person, place, and time  She has normal reflexes  Skin: Skin is warm and dry  Psychiatric: She has a normal mood and affect   Her behavior is normal  Judgment and thought content normal

## 2018-06-19 ENCOUNTER — OFFICE VISIT (OUTPATIENT)
Dept: OBGYN CLINIC | Facility: MEDICAL CENTER | Age: 23
End: 2018-06-19
Payer: COMMERCIAL

## 2018-06-19 VITALS — WEIGHT: 146.7 LBS | BODY MASS INDEX: 22.98 KG/M2 | SYSTOLIC BLOOD PRESSURE: 118 MMHG | DIASTOLIC BLOOD PRESSURE: 72 MMHG

## 2018-06-19 DIAGNOSIS — N83.202 CYST OF LEFT OVARY: Primary | ICD-10-CM

## 2018-06-19 PROCEDURE — 99213 OFFICE O/P EST LOW 20 MIN: CPT | Performed by: OBSTETRICS & GYNECOLOGY

## 2018-06-19 RX ORDER — NORGESTIMATE AND ETHINYL ESTRADIOL 0.25-0.035
1 KIT ORAL DAILY
Qty: 28 TABLET | Refills: 3 | Status: SHIPPED | OUTPATIENT
Start: 2018-06-19 | End: 2020-10-21 | Stop reason: SDUPTHER

## 2018-06-19 NOTE — PROGRESS NOTES
Assessment Evette was seen today for follow-up  Diagnoses and all orders for this visit:    Cyst of left ovary  -     MRI pelvis w wo contrast; Future  -     norgestimate-ethinyl estradiol (ORTHO-CYCLEN) 0 25-35 MG-MCG per tablet; Take 1 tablet by mouth daily         Plan we discussed US   Patient currently asymptomatic  We discussed high probability of dermoid cyst   We discussed dermoid cyst in detail , we discussed possible cystectomy vs oophorectomy   We discussed MRI for better characterization  Gave patient option to discuss with parents and to have follow up after discussion  Sveta Polanco is a 23 y o  female here for a follow up from [address] sent for incidental CT scan finding of ovarian cyst    Denies pain     Patient Active Problem List   Diagnosis    S[facility] ([facility] [address]    Acute pyelonephritis    Ureteral stone    Left nephrolithiasis    Volar retinacular ganglion       Gynecologic History  Patient's last menstrual period was 05/24/2018 (exact date)  Past Medical History:   Diagnosis Date    Kidney stone      Past Surgical History:   Procedure Laterality Date    NV CYSTO/URETERO W/LITHOTRIPSY &INDWELL STENT INSRT Left 5/24/2018    Procedure: CYSTOSCOPY URETEROSCOPY WITH LITHOTRIPSY HOLMIUM LASER, RETROGRADE PYELOGRAM AND INSERTION STENT URETERAL BASKET STONE EXTRACTION;  Surgeon: Kelton Kenney MD;  Location: [facility];  Service: Urology    NV CYSTOURETHROSCOPY,URETER CATHETER Left 3/29/2018    Procedure: CYSTOSCOPY RETROGRADE PYELOGRAM WITH INSERTION STENT URETERAL;  Surgeon: Gary Mack MD;  Location: [facility];  Service: Urology     Family History   Problem Relation Age of Onset    No Known Problems Mother     No Known Problems Father      Social History     Social History    Marital status: Single     Spouse name: N/A    Number of children: N/A    Years of education: N/A     Occupational History    Not on file       Social History Main Topics    Smoking status: Never Smoker    Smokeless tobacco: Never Used    Alcohol use Yes      Comment: occasionally    Drug use: No    Sexual activity: Yes     Partners: Male     Other Topics Concern    Not on file     Social History Narrative    No narrative on file     Allergies   Allergen Reactions    Shellfish-Derived Products Hives       Current Outpatient Prescriptions:     norgestimate-ethinyl estradiol (ORTHO-CYCLEN) 0 25-35 MG-MCG per tablet, Take 1 tablet by mouth daily, Disp: 28 tablet, Rfl: 3    Review of Systems  Constitutional :no fever, feels well, no tiredness, no recent weight gain or loss  ENT: no ear ache, no loss of hearing, no nosebleeds or nasal discharge, no sore throat or hoarseness  Cardiovascular: no complaints of slow or fast heart beat, no chest pain, no palpitations, no leg claudication or lower extremity edema  Respiratory: no complaints of shortness of shortness of breath, no TERRAZAS  Breasts:no complaints of breast pain, breast lump, or nipple discharge  Gastrointestinal: no complaints of abdominal pain, constipation, nausea, vomiting, or diarrhea or bloody stools  Genitourinary : no complaints of dysuria, incontinence, pelvic pain, no dysmenorrhea, vaginal discharge or abnormal vaginal bleeding and as noted in HPI  Musculoskeletal: no complaints of arthralgia, no myalgia, no joint swelling or stiffness, no limb pain or swelling  Integumentary: no complaints of skin rash or lesion, itching or dry skin  Neurological: no complaints of headache, no confusion, no numbness or tingling, no dizziness or fainting     Objective     /72   Wt 66 5 kg (146 lb 11 2 oz)   LMP 05/24/2018 (Exact Date)   Breastfeeding?  No   BMI 22 98 kg/m²     General:   appears stated age, cooperative, alert normal mood and affect   Psychiatric orientation to person, place, and time: normal  mood and affect: normal   OVARIES/ADNEXA:  Right ovary: 5 mL (normal is 2 mL to 20 mL)  No suspicious right ovarian abnormality  Doppler flow within normal limits      Left ovary: Left ovary is enlarged measuring 56 mL in volume  Complex multiloculated cystic mass in the left ovary measuring 5 5 cm x 3 6 cm x 5 0 cm  Some of the locules contain low-level echoes while others contain anechoic fluid  There is also a solid-appearing, variegated nodular component measuring 16 mm   within the mass      Hypoechoic structure with hypervascular rim also seen in the left ovary likely represents a crenulated corpus luteum      No free pelvic fluid      IMPRESSION:     5 5 cm complex cystic mass in the left ovary  Differential considerations include dermoid cyst or stromal neoplasm    Recommend further evaluation with MRI for more complete characterization of internal composition

## 2018-06-25 ENCOUNTER — CLINICAL SUPPORT (OUTPATIENT)
Dept: FAMILY MEDICINE CLINIC | Facility: CLINIC | Age: 23
End: 2018-06-25

## 2018-06-25 DIAGNOSIS — Z11.1 ENCOUNTER FOR PPD TEST: Primary | ICD-10-CM

## 2018-06-27 ENCOUNTER — CLINICAL SUPPORT (OUTPATIENT)
Dept: FAMILY MEDICINE CLINIC | Facility: CLINIC | Age: 23
End: 2018-06-27
Payer: COMMERCIAL

## 2018-06-27 DIAGNOSIS — Z11.1 ENCOUNTER FOR PPD SKIN TEST READING: Primary | ICD-10-CM

## 2018-06-27 LAB
INDURATION: 0.1 MM
TB SKIN TEST: NEGATIVE

## 2018-06-27 PROCEDURE — 86580 TB INTRADERMAL TEST: CPT | Performed by: FAMILY MEDICINE

## 2018-07-31 ENCOUNTER — HOSPITAL ENCOUNTER (OUTPATIENT)
Dept: MRI IMAGING | Facility: HOSPITAL | Age: 23
Discharge: HOME | End: 2018-07-31
Attending: OBSTETRICS & GYNECOLOGY
Payer: COMMERCIAL

## 2018-07-31 ENCOUNTER — TELEPHONE (OUTPATIENT)
Dept: OBGYN CLINIC | Facility: MEDICAL CENTER | Age: 23
End: 2018-07-31

## 2018-07-31 DIAGNOSIS — N83.202 CYST OF LEFT OVARY: ICD-10-CM

## 2018-07-31 PROCEDURE — A9585 GADOBUTROL INJECTION: HCPCS | Performed by: RADIOLOGY

## 2018-07-31 PROCEDURE — 72197 MRI PELVIS W/O & W/DYE: CPT

## 2018-07-31 RX ADMIN — GADOBUTROL 7 ML: 604.72 INJECTION INTRAVENOUS at 11:26

## 2018-08-01 ENCOUNTER — OFFICE VISIT (OUTPATIENT)
Dept: OBGYN CLINIC | Facility: MEDICAL CENTER | Age: 23
End: 2018-08-01
Payer: COMMERCIAL

## 2018-08-01 VITALS — SYSTOLIC BLOOD PRESSURE: 132 MMHG | BODY MASS INDEX: 24.25 KG/M2 | DIASTOLIC BLOOD PRESSURE: 62 MMHG | WEIGHT: 154.8 LBS

## 2018-08-01 DIAGNOSIS — N83.8 OVARIAN MASS, LEFT: Primary | ICD-10-CM

## 2018-08-01 PROCEDURE — 99213 OFFICE O/P EST LOW 20 MIN: CPT | Performed by: OBSTETRICS & GYNECOLOGY

## 2018-08-01 NOTE — PROGRESS NOTES
Assessment Evette was seen today for follow-up  Diagnoses and all orders for this visit:    Ovarian mass, left  -     Ambulatory referral to Gynecologic Oncology; Future  Scheduled with Dr Joan Farmer on 8/7 @ 3:30 pm for surgical consultation        Plan: Today we reviewed persistent left ovarian mass of stable size when reviewing imaging from before  I did discuss with patient and parents recommendation for surgical removal likely in the form of oophorectomy  I did recommend GYN onc consultation and appointment was made before they left office  We did discuss possible benign etiology such as dermoid cyst  vs malignancy vs borderline tumor but this wont be known until surgical pathology obtained  She is aware that malignancy is rare in this age group   We did discuss possible tumor markers  however decision was left for time of oncology consultation as this may not necessarily add more information at this time  All questions were answered  We did discuss possible implications of oophorectomy in future fertility , however aware that most times fertility does not seem to be affected  Subjective   Mirlande Perez is a 23 y o  female here for a follow up visit from MRI sent for incidental finding of ovarian Mass on CT scan in May which was done as part of work up for obstructing kidney stone   Patient currently states she has occasional left sided discomfort but denies constant pain  She presents with her parents to appointment   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 and is due to go back in September     Patient Active Problem List   Diagnosis    Sepsis ([address] )    Acute pyelonephritis    Ureteral stone    Left nephrolithiasis    Volar retinacular ganglion       Gynecologic History  Patient's last menstrual period was 07/16/2018 (exact date)        Past Medical History:   Diagnosis Date    Kidney stone      Past Surgical History:   Procedure Laterality Date    NV CYSTO/URETERO W/LITHOTRIPSY &INDWELL STENT INSRT Left 5/24/2018    Procedure: CYSTOSCOPY URETEROSCOPY WITH LITHOTRIPSY HOLMIUM LASER, RETROGRADE PYELOGRAM AND INSERTION STENT URETERAL BASKET STONE EXTRACTION;  Surgeon: Savi Pandya MD;  Location: [facility];  Service: Urology    [place] CYSTOURETHROSCOPY,URETER CATHETER Left 3/29/2018    Procedure: CYSTOSCOPY RETROGRADE PYELOGRAM WITH INSERTION STENT URETERAL;  Surgeon: Hugo Bolivar MD;  [facility];  Service: Urology     Family History   Problem Relation Age of Onset    No Known Problems Mother     No Known Problems Father      Social History     Social History    Marital status: Single     Spouse name: N/A    Number of children: N/A    Years of education: N/A     Occupational History    Not on file  Social History Main Topics    Smoking status: Never Smoker    Smokeless tobacco: Never Used    Alcohol use Yes      Comment: occasionally    Drug use: No    Sexual activity: Yes     Partners: Male     Other Topics Concern    Not on file     Social History Narrative    No narrative on file     Allergies   Allergen Reactions    Shellfish-Derived Products Hives       Current Outpatient Prescriptions:     norgestimate-ethinyl estradiol (ORTHO-CYCLEN) 0 25-35 MG-MCG per tablet, Take 1 tablet by mouth daily, Disp: 28 tablet, Rfl: 3    Review of Systems  Constitutional :no fever, feels well, no tiredness, no recent weight gain or loss  ENT: no ear ache, no loss of hearing, no nosebleeds or nasal discharge, no sore throat or hoarseness  Cardiovascular: no complaints of slow or fast heart beat, no chest pain, no palpitations, no leg claudication or lower extremity edema  Respiratory: no complaints of shortness of shortness of breath, no TERRAZAS  Breasts:no complaints of breast pain, breast lump, or nipple discharge  Gastrointestinal: no complaints of abdominal pain, constipation, nausea, vomiting, or diarrhea or bloody stools  Genitourinary :  as noted in HPI    Musculoskeletal: no complaints of arthralgia, no myalgia, no joint swelling or stiffness, no limb pain or swelling  Integumentary: no complaints of skin rash or lesion, itching or dry skin  Neurological: no complaints of headache, no confusion, no numbness or tingling, no dizziness or fainting     Objective     /62   Wt 70 2 kg (154 lb 12 8 oz)   LMP 07/16/2018 (Exact Date)   Breastfeeding? No   BMI 24 25 kg/m²     General:   appears stated age, cooperative, alert normal mood and affect   Psychiatric orientation to person, place, and time: normal  mood and affect: normal     MRI findings :      UTERUS: The uterus measures 7 7 x 3 5 x 5 6 cm  The endometrial thickness measures 4 mm  Junctional zone:  Normal in thickness  Myometrium:  Normal   Endometrium: Normal thickness without mass      ADNEXA:    Right: The right ovary measures 2 5 x 1 7 x 2 5 cm  A follicle is seen within the right ovary measuring 1 4 x 0 9 cm x 0 8 cm  Ovary normal in size and morphology  No adnexal mass identified  No evidence of hydrosalpinx         Left:  There is a complex left adnexal mass  This measures 5 1 x 3 4 x 6 3 cm  There are multiple septae seen within this mass  There is mild nodularity within this mass  There is no associated the free fluid    There is a 6 3 x 3 4 x 5 1 cm septated left adnexal/ovarian mass  There is highly suspicious for neoplasm    There is not     No pelvic sidewall lymphadenopathy  No ascites  GYN oncology evaluation suggested

## 2018-08-07 ENCOUNTER — OFFICE VISIT (OUTPATIENT)
Dept: GYNECOLOGIC ONCOLOGY | Facility: CLINIC | Age: 23
End: 2018-08-07
Payer: COMMERCIAL

## 2018-08-07 VITALS
HEIGHT: 67 IN | RESPIRATION RATE: 14 BRPM | TEMPERATURE: 98.5 F | DIASTOLIC BLOOD PRESSURE: 70 MMHG | SYSTOLIC BLOOD PRESSURE: 110 MMHG | BODY MASS INDEX: 24.64 KG/M2 | HEART RATE: 74 BPM | WEIGHT: 157 LBS

## 2018-08-07 DIAGNOSIS — N83.8 OVARIAN MASS, LEFT: ICD-10-CM

## 2018-08-07 PROBLEM — N10 ACUTE PYELONEPHRITIS: Status: RESOLVED | Noted: 2018-03-29 | Resolved: 2018-08-07

## 2018-08-07 PROBLEM — N20.1 URETERAL STONE: Status: RESOLVED | Noted: 2018-03-29 | Resolved: 2018-08-07

## 2018-08-07 PROBLEM — A41.9 SEPSIS (HCC): Status: RESOLVED | Noted: 2018-03-29 | Resolved: 2018-08-07

## 2018-08-07 PROBLEM — N20.0 LEFT NEPHROLITHIASIS: Status: RESOLVED | Noted: 2018-04-04 | Resolved: 2018-08-07

## 2018-08-07 PROCEDURE — 99244 OFF/OP CNSLTJ NEW/EST MOD 40: CPT | Performed by: OBSTETRICS & GYNECOLOGY

## 2018-08-07 NOTE — LETTER
August 7, 2018     Referral [address] [facility] [address]    Patient: Juancho Turner   YOB: 1995   Date of Visit: 8/7/2018       Dear Dr Beena Porter:    Thank you for referring Juancho Turner to me for evaluation  Below are my notes for this consultation  If you have questions, please do not hesitate to call me  I look forward to following your patient along with you           Sincerely,        Jeffrey Mandujano MD        CC: MD Galina Monroy MD

## 2018-08-07 NOTE — ASSESSMENT & PLAN NOTE
-   Multicystic left adnexal mass  Minimal nodularity but multiple septa  No solid components  No free fluid  No other stigmata of malignancy  I discussed with patient and her parents differential diagnosis including benign, borderline and malignant pathology  I expressed how possibility of malignancy seems incredibly low  However, given size, persistence and some symptoms have recommended definitive surgical therapy  Patient has assented and her mother consented  for laparoscopic left ovarian cystectomy versus salpingo-oophorectomy  They understand most conservative therapy will be undertaken depending on intraoperative findings  Patient is young and healthy  Will plan to obtain same date type and screen and urine pregnancy test   No other testing is indicated  I discussed with patient indication, risks, benefits and alternatives of surgical exploration  We discussed possibility of bleeding requiring blood transfusion, life-threatening infections requiring additional procedures, injuries to surrounding organs such as bladder, ureters, gastrointestinal tract and or neurovascular structures  Additionally, we discussed general risks associated to stress of surgery such as venous thromboembolism, acute myocardial events and stroke  All questions answered to patient's satisfaction  She agrees and wants to proceed  Informed consent form signed

## 2018-08-07 NOTE — H&P
Assessment/Plan:    Problem List Items Addressed This Visit        Other    Ovarian mass, left     -   Multicystic left adnexal mass  Minimal nodularity but multiple septa  No solid components  No free fluid  No other stigmata of malignancy  I discussed with patient and her parents differential diagnosis including benign, borderline and malignant pathology  I expressed how possibility of malignancy seems incredibly low  However, given size, persistence and some symptoms have recommended definitive surgical therapy  Patient has assented and her mother consented  for laparoscopic left ovarian cystectomy versus salpingo-oophorectomy  They understand most conservative therapy will be undertaken depending on intraoperative findings  Patient is young and healthy  Will plan to obtain same date type and screen and urine pregnancy test   No other testing is indicated  I discussed with patient indication, risks, benefits and alternatives of surgical exploration  We discussed possibility of bleeding requiring blood transfusion, life-threatening infections requiring additional procedures, injuries to surrounding organs such as bladder, ureters, gastrointestinal tract and or neurovascular structures  Additionally, we discussed general risks associated to stress of surgery such as venous thromboembolism, acute myocardial events and stroke  All questions answered to patient's satisfaction  She agrees and wants to proceed  Informed consent form signed  CHIEF COMPLAINT:       Patient referred for consultation surgical intervention given persistent left ovarian mass, complex  Patient ID: Ashley Casas is a 23 y o  female  HPI    82-year-old G0  in her usual state of health until March 2018  At that time, she was diagnosed with pyelonephritis due to obstructive kidney stones  CT scan demonstrated incidental left adnexal mass    After completion of treatment for kidney stones this was further evaluated recently by ultrasound and subsequent MRI  This demonstrated persistent left adnexal mass now measuring approximately 6 cm with complex septations and minimal wall nodularity  No free fluid  Radiologist brings concern for possible neoplasm  There are no definitive stigmata of malignancy or metastatic disease  Patient reports left pelvic pain, moderate, occasionally radiated to left groin  Normal menstrual cycles  No changes in bowel bladder function  No intermenstrual bleeding  Review of Systems    As above  Otherwise 12 point review of systems is unremarkable  Current Outpatient Prescriptions   Medication Sig Dispense Refill    norgestimate-ethinyl estradiol (ORTHO-CYCLEN) 0 25-35 MG-MCG per tablet Take 1 tablet by mouth daily 28 tablet 3     No current facility-administered medications for this visit          Allergies   Allergen Reactions    Shellfish-Derived Products Hives       Past Medical History:   Diagnosis Date    Kidney stone     Kidney stones        Past Surgical History:   Procedure Laterality Date    NE CYSTO/URETERO W/LITHOTRIPSY &INDWELL STENT INSRT Left 2018    Procedure: CYSTOSCOPY URETEROSCOPY WITH LITHOTRIPSY HOLMIUM LASER, RETROGRADE PYELOGRAM AND INSERTION STENT URETERAL BASKET STONE EXTRACTION;  Surgeon: Lillian Juárez MD;  Location: [facility];  Service: Urology    NE CYSTOURETHROSCOPY,URETER CATHETER Left 3/29/2018    Procedure: CYSTOSCOPY RETROGRADE PYELOGRAM WITH INSERTION STENT URETERAL;  Surgeon: Thaddeus Dancer, MD;  Location: [facility];  Service: Urology       OB History      Para Term  AB Living    0 0 0 0 0 0    SAB TAB Ectopic Multiple Live Births    0 0 0 0 0        Obstetric Comments    Marche 15   Hx of BCp             Family History   Problem Relation Age of Onset    No Known Problems Mother     No Known Problems Father        The following portions of the patient's history were reviewed and updated as appropriate: allergies, current medications, past family history, past medical history, past social history, past surgical history and problem list       Objective:    Blood pressure 110/70, pulse 74, temperature 98 5 °F (36 9 °C), temperature source Oral, resp  rate 14, height 5' 7" (1 702 m), weight 71 2 kg (157 lb), last menstrual period 07/16/2018, not currently breastfeeding  Body mass index is 24 59 kg/m²  Physical Exam   Constitutional: She is oriented to person, place, and time  She appears well-developed and well-nourished  HENT:   Head: Normocephalic and atraumatic  Neck: Normal range of motion  Neck supple  No thyromegaly present  Cardiovascular: Normal rate, regular rhythm and normal heart sounds  No murmur heard  Pulmonary/Chest: Effort normal and breath sounds normal  No respiratory distress  She has no rales  Abdominal: Soft  She exhibits no distension and no mass  There is no rebound  Navel ring with no signs of infection or hernias  Genitourinary:   Genitourinary Comments:   Pelvic exam deferred  Musculoskeletal: Normal range of motion  She exhibits no edema  Lymphadenopathy:     She has no cervical adenopathy  Neurological: She is alert and oriented to person, place, and time  Skin: Skin is warm and dry  No rash noted  Psychiatric: She has a normal mood and affect  Her behavior is normal    Vitals reviewed        No results found for:   Lab Results   Component Value Date    WBC 6 87 03/31/2018    HGB 10 6 (L) 03/31/2018    HCT 31 5 (L) 03/31/2018    MCV 89 03/31/2018     03/31/2018     Lab Results   Component Value Date     03/31/2018    K 3 0 (L) 03/31/2018     03/31/2018    CO2 22 03/31/2018    ANIONGAP 11 03/31/2018    BUN 11 03/31/2018    CREATININE 1 02 03/31/2018    GLUCOSE 94 03/31/2018    CALCIUM 8 5 03/31/2018    AST 16 03/28/2018    ALT 23 03/28/2018    ALKPHOS 65 03/28/2018    PROT 7 7 03/28/2018    BILITOT 0 60 03/28/2018    EGFR 79 03/31/2018     Study Result     MRI OF THE PELVIS WITH AND WITHOUT CONTRAST (GYNECOLOGIC)     INDICATION:  Adnexal lesion     COMPARISON: Previous CT from March 28, 2018, previous the ultrasound from March 22, 2018     TECHNIQUE: The following pulse sequences were obtained on a 1 5 T scanner:  Axial T1, axial and sagittal T2, coronal T2 with fat saturation, pre-contrast axial T1 with fat saturation, post-contrast axial and sagittal T1 with fat saturation       IV Contrast:  7 mL of Gadobutrol injection (SINGLE-DOSE)      FINDINGS:     UTERUS: The uterus measures 7 7 x 3 5 x 5 6 cm  The endometrial thickness measures 4 mm  Junctional zone:  Normal in thickness  Myometrium:  Normal   Endometrium: Normal thickness without mass      ADNEXA:    Right: The right ovary measures 2 5 x 1 7 x 2 5 cm  A follicle is seen within the right ovary measuring 1 4 x 0 9 cm x 0 8 cm  Ovary normal in size and morphology  No adnexal mass identified  No evidence of hydrosalpinx         Left:  There is a complex left adnexal mass  This measures 5 1 x 3 4 x 6 3 cm  There are multiple septae seen within this mass  There is mild nodularity within this mass  There is no associated the free fluid     BLADDER: Normal      PELVIC CAVITY:  No free fluid seen  No pelvic sidewall lymph node enlargement seen     BOWEL:  Unremarkable MRI appearance      OSSEOUS STRUCTURES:   No osseous destruction      VASCULAR STRUCTURES:  Visualized vasculature is normal      PELVIC WALL:  Normal      IMPRESSION:     There is a 6 3 x 3 4 x 5 1 cm septated left adnexal/ovarian mass  There is highly suspicious for neoplasm    There is not     No pelvic sidewall lymphadenopathy  No ascites  GYN oncology evaluation suggested           Workstation performed: Fabian Vick MD, [facility] [address]  8/7/2018  4:29 PM

## 2018-08-08 ENCOUNTER — ANESTHESIA EVENT (OUTPATIENT)
Dept: PERIOP | Facility: HOSPITAL | Age: 23
End: 2018-08-08
Payer: COMMERCIAL

## 2018-08-08 NOTE — ANESTHESIA PREPROCEDURE EVALUATION
Review of Systems/Medical History  Patient summary reviewed  Chart reviewed  No history of anesthetic complications     Cardiovascular  Negative cardio ROS Exercise tolerance (METS): >4,     Pulmonary  Negative pulmonary ROS        GI/Hepatic  Negative GI/hepatic ROS          Negative  ROS        Endo/Other  Negative endo/other ROS      GYN  Not currently pregnant ,     Comment: Left ovarian mass     Hematology  Negative hematology ROS      Musculoskeletal  Negative musculoskeletal ROS        Neurology  Negative neurology ROS      Psychology   Negative psychology ROS              Physical Exam    Airway    Mallampati score: II  TM Distance: >3 FB  Neck ROM: full     Dental   No notable dental hx     Cardiovascular  Comment: Negative ROS, Rhythm: regular, Rate: normal, Cardiovascular exam normal    Pulmonary  Pulmonary exam normal Breath sounds clear to auscultation,     Other Findings        Anesthesia Plan  ASA Score- 1     Anesthesia Type- general with ASA Monitors  Additional Monitors:   Airway Plan: ETT  Plan Factors-    Induction- intravenous  Postoperative Plan- Plan for postoperative opioid use  Planned trial extubation    Informed Consent- Anesthetic plan and risks discussed with patient, mother and father  NPO and allergies verified  Patient reports allergy to shellfish products but states she has tolerated topical betadine  Urine pregnancy test negative  Plan:  GETA    Risks and benefits discussed with patient  Questions answered  Patient consented

## 2018-08-09 ENCOUNTER — HOSPITAL ENCOUNTER (OUTPATIENT)
Facility: HOSPITAL | Age: 23
Setting detail: OUTPATIENT SURGERY
Discharge: HOME | End: 2018-08-09
Attending: OBSTETRICS & GYNECOLOGY | Admitting: OBSTETRICS & GYNECOLOGY
Payer: COMMERCIAL

## 2018-08-09 ENCOUNTER — ANESTHESIA (OUTPATIENT)
Dept: PERIOP | Facility: HOSPITAL | Age: 23
End: 2018-08-09
Payer: COMMERCIAL

## 2018-08-09 VITALS
SYSTOLIC BLOOD PRESSURE: 136 MMHG | HEART RATE: 54 BPM | WEIGHT: 157 LBS | BODY MASS INDEX: 24.64 KG/M2 | TEMPERATURE: 98 F | HEIGHT: 67 IN | DIASTOLIC BLOOD PRESSURE: 76 MMHG | RESPIRATION RATE: 18 BRPM | OXYGEN SATURATION: 100 %

## 2018-08-09 DIAGNOSIS — N83.8 OVARIAN MASS, LEFT: Primary | ICD-10-CM

## 2018-08-09 LAB
ABO GROUP BLD: NORMAL
BLD GP AB SCN SERPL QL: NEGATIVE
ERYTHROCYTE [DISTWIDTH] IN BLOOD BY AUTOMATED COUNT: 14.4 % (ref 11.6–15.1)
EXT PREGNANCY TEST URINE: NEGATIVE
HCT VFR BLD AUTO: 37.2 % (ref 34.8–46.1)
HGB BLD-MCNC: 11.6 G/DL (ref 11.5–15.4)
MCH RBC QN AUTO: 27.8 PG (ref 26.8–34.3)
MCHC RBC AUTO-ENTMCNC: 31.2 G/DL (ref 31.4–37.4)
MCV RBC AUTO: 89 FL (ref 82–98)
PLATELET # BLD AUTO: 204 THOUSANDS/UL (ref 149–390)
PMV BLD AUTO: 9.7 FL (ref 8.9–12.7)
RBC # BLD AUTO: 4.18 MILLION/UL (ref 3.81–5.12)
RH BLD: POSITIVE
SPECIMEN EXPIRATION DATE: NORMAL
WBC # BLD AUTO: 6.45 THOUSAND/UL (ref 4.31–10.16)

## 2018-08-09 PROCEDURE — 86901 BLOOD TYPING SEROLOGIC RH(D): CPT | Performed by: OBSTETRICS & GYNECOLOGY

## 2018-08-09 PROCEDURE — 88307 TISSUE EXAM BY PATHOLOGIST: CPT | Performed by: PATHOLOGY

## 2018-08-09 PROCEDURE — 58661 LAPAROSCOPY REMOVE ADNEXA: CPT | Performed by: OBSTETRICS & GYNECOLOGY

## 2018-08-09 PROCEDURE — 85027 COMPLETE CBC AUTOMATED: CPT | Performed by: OBSTETRICS & GYNECOLOGY

## 2018-08-09 PROCEDURE — 86850 RBC ANTIBODY SCREEN: CPT | Performed by: OBSTETRICS & GYNECOLOGY

## 2018-08-09 PROCEDURE — 86900 BLOOD TYPING SEROLOGIC ABO: CPT | Performed by: OBSTETRICS & GYNECOLOGY

## 2018-08-09 PROCEDURE — 81025 URINE PREGNANCY TEST: CPT | Performed by: OBSTETRICS & GYNECOLOGY

## 2018-08-09 RX ORDER — KETOROLAC TROMETHAMINE 10 MG/1
10 TABLET, FILM COATED ORAL EVERY 8 HOURS PRN
Qty: 9 TABLET | Refills: 0 | Status: SHIPPED | OUTPATIENT
Start: 2018-08-09 | End: 2018-08-24 | Stop reason: CLARIF

## 2018-08-09 RX ORDER — OXYCODONE HYDROCHLORIDE AND ACETAMINOPHEN 5; 325 MG/1; MG/1
2 TABLET ORAL EVERY 6 HOURS PRN
Status: DISCONTINUED | OUTPATIENT
Start: 2018-08-09 | End: 2018-08-09 | Stop reason: HOSPADM

## 2018-08-09 RX ORDER — IBUPROFEN 600 MG/1
600 TABLET ORAL EVERY 6 HOURS PRN
Status: DISCONTINUED | OUTPATIENT
Start: 2018-08-09 | End: 2018-08-09 | Stop reason: HOSPADM

## 2018-08-09 RX ORDER — MIDAZOLAM HYDROCHLORIDE 1 MG/ML
INJECTION INTRAMUSCULAR; INTRAVENOUS AS NEEDED
Status: DISCONTINUED | OUTPATIENT
Start: 2018-08-09 | End: 2018-08-09 | Stop reason: SURG

## 2018-08-09 RX ORDER — FENTANYL CITRATE/PF 50 MCG/ML
25 SYRINGE (ML) INJECTION
Status: DISCONTINUED | OUTPATIENT
Start: 2018-08-09 | End: 2018-08-09 | Stop reason: HOSPADM

## 2018-08-09 RX ORDER — GLYCOPYRROLATE 0.2 MG/ML
INJECTION INTRAMUSCULAR; INTRAVENOUS AS NEEDED
Status: DISCONTINUED | OUTPATIENT
Start: 2018-08-09 | End: 2018-08-09 | Stop reason: SURG

## 2018-08-09 RX ORDER — SODIUM CHLORIDE, SODIUM LACTATE, POTASSIUM CHLORIDE, CALCIUM CHLORIDE 600; 310; 30; 20 MG/100ML; MG/100ML; MG/100ML; MG/100ML
100 INJECTION, SOLUTION INTRAVENOUS CONTINUOUS
Status: DISCONTINUED | OUTPATIENT
Start: 2018-08-09 | End: 2018-08-09 | Stop reason: HOSPADM

## 2018-08-09 RX ORDER — ONDANSETRON 2 MG/ML
4 INJECTION INTRAMUSCULAR; INTRAVENOUS EVERY 6 HOURS PRN
Status: DISCONTINUED | OUTPATIENT
Start: 2018-08-09 | End: 2018-08-09 | Stop reason: HOSPADM

## 2018-08-09 RX ORDER — ONDANSETRON 2 MG/ML
4 INJECTION INTRAMUSCULAR; INTRAVENOUS ONCE AS NEEDED
Status: DISCONTINUED | OUTPATIENT
Start: 2018-08-09 | End: 2018-08-09 | Stop reason: HOSPADM

## 2018-08-09 RX ORDER — SODIUM CHLORIDE 9 MG/ML
INJECTION, SOLUTION INTRAVENOUS CONTINUOUS PRN
Status: DISCONTINUED | OUTPATIENT
Start: 2018-08-09 | End: 2018-08-09 | Stop reason: SURG

## 2018-08-09 RX ORDER — BUPIVACAINE HYDROCHLORIDE 2.5 MG/ML
INJECTION, SOLUTION INFILTRATION; PERINEURAL AS NEEDED
Status: DISCONTINUED | OUTPATIENT
Start: 2018-08-09 | End: 2018-08-09 | Stop reason: HOSPADM

## 2018-08-09 RX ORDER — KETOROLAC TROMETHAMINE 30 MG/ML
INJECTION, SOLUTION INTRAMUSCULAR; INTRAVENOUS AS NEEDED
Status: DISCONTINUED | OUTPATIENT
Start: 2018-08-09 | End: 2018-08-09 | Stop reason: SURG

## 2018-08-09 RX ORDER — SODIUM CHLORIDE, SODIUM LACTATE, POTASSIUM CHLORIDE, CALCIUM CHLORIDE 600; 310; 30; 20 MG/100ML; MG/100ML; MG/100ML; MG/100ML
125 INJECTION, SOLUTION INTRAVENOUS CONTINUOUS
Status: DISCONTINUED | OUTPATIENT
Start: 2018-08-09 | End: 2018-08-09 | Stop reason: HOSPADM

## 2018-08-09 RX ORDER — OXYCODONE HYDROCHLORIDE AND ACETAMINOPHEN 5; 325 MG/1; MG/1
1 TABLET ORAL EVERY 4 HOURS PRN
Status: DISCONTINUED | OUTPATIENT
Start: 2018-08-09 | End: 2018-08-09 | Stop reason: HOSPADM

## 2018-08-09 RX ORDER — LIDOCAINE HYDROCHLORIDE 10 MG/ML
INJECTION, SOLUTION INFILTRATION; PERINEURAL AS NEEDED
Status: DISCONTINUED | OUTPATIENT
Start: 2018-08-09 | End: 2018-08-09 | Stop reason: SURG

## 2018-08-09 RX ORDER — ROCURONIUM BROMIDE 10 MG/ML
INJECTION, SOLUTION INTRAVENOUS AS NEEDED
Status: DISCONTINUED | OUTPATIENT
Start: 2018-08-09 | End: 2018-08-09 | Stop reason: SURG

## 2018-08-09 RX ORDER — ONDANSETRON 2 MG/ML
INJECTION INTRAMUSCULAR; INTRAVENOUS AS NEEDED
Status: DISCONTINUED | OUTPATIENT
Start: 2018-08-09 | End: 2018-08-09 | Stop reason: SURG

## 2018-08-09 RX ORDER — PROPOFOL 10 MG/ML
INJECTION, EMULSION INTRAVENOUS AS NEEDED
Status: DISCONTINUED | OUTPATIENT
Start: 2018-08-09 | End: 2018-08-09 | Stop reason: SURG

## 2018-08-09 RX ORDER — FENTANYL CITRATE 50 UG/ML
INJECTION, SOLUTION INTRAMUSCULAR; INTRAVENOUS AS NEEDED
Status: DISCONTINUED | OUTPATIENT
Start: 2018-08-09 | End: 2018-08-09 | Stop reason: SURG

## 2018-08-09 RX ADMIN — DEXAMETHASONE SODIUM PHOSPHATE 5 MG: 10 INJECTION INTRAMUSCULAR; INTRAVENOUS at 13:56

## 2018-08-09 RX ADMIN — OXYCODONE HYDROCHLORIDE AND ACETAMINOPHEN 1 TABLET: 5; 325 TABLET ORAL at 16:51

## 2018-08-09 RX ADMIN — SODIUM CHLORIDE: 0.9 INJECTION, SOLUTION INTRAVENOUS at 13:56

## 2018-08-09 RX ADMIN — FENTANYL CITRATE 50 MCG: 50 INJECTION, SOLUTION INTRAMUSCULAR; INTRAVENOUS at 13:45

## 2018-08-09 RX ADMIN — NEOSTIGMINE METHYLSULFATE 4 MG: 1 INJECTION, SOLUTION INTRAMUSCULAR; INTRAVENOUS; SUBCUTANEOUS at 14:45

## 2018-08-09 RX ADMIN — FENTANYL CITRATE 50 MCG: 50 INJECTION, SOLUTION INTRAMUSCULAR; INTRAVENOUS at 14:10

## 2018-08-09 RX ADMIN — FENTANYL CITRATE 25 MCG: 50 INJECTION, SOLUTION INTRAMUSCULAR; INTRAVENOUS at 15:07

## 2018-08-09 RX ADMIN — SODIUM CHLORIDE, SODIUM LACTATE, POTASSIUM CHLORIDE, AND CALCIUM CHLORIDE 100 ML/HR: .6; .31; .03; .02 INJECTION, SOLUTION INTRAVENOUS at 09:26

## 2018-08-09 RX ADMIN — FENTANYL CITRATE 25 MCG: 50 INJECTION, SOLUTION INTRAMUSCULAR; INTRAVENOUS at 15:20

## 2018-08-09 RX ADMIN — ONDANSETRON 4 MG: 2 INJECTION INTRAMUSCULAR; INTRAVENOUS at 14:39

## 2018-08-09 RX ADMIN — PROPOFOL 150 MG: 10 INJECTION, EMULSION INTRAVENOUS at 13:46

## 2018-08-09 RX ADMIN — MIDAZOLAM 2 MG: 1 INJECTION INTRAMUSCULAR; INTRAVENOUS at 13:35

## 2018-08-09 RX ADMIN — ROCURONIUM BROMIDE 40 MG: 10 INJECTION INTRAVENOUS at 13:47

## 2018-08-09 RX ADMIN — GLYCOPYRROLATE 0.6 MG: 0.2 INJECTION, SOLUTION INTRAMUSCULAR; INTRAVENOUS at 14:45

## 2018-08-09 RX ADMIN — LIDOCAINE HYDROCHLORIDE 50 MG: 10 INJECTION, SOLUTION INFILTRATION; PERINEURAL at 13:45

## 2018-08-09 RX ADMIN — KETOROLAC TROMETHAMINE 30 MG: 30 INJECTION, SOLUTION INTRAMUSCULAR at 14:40

## 2018-08-09 NOTE — H&P (VIEW-ONLY)
Assessment/Plan:    Problem List Items Addressed This Visit        Other    Ovarian mass, left     -   Multicystic left adnexal mass  Minimal nodularity but multiple septa  No solid components  No free fluid  No other stigmata of malignancy  I discussed with patient and her parents differential diagnosis including benign, borderline and malignant pathology  I expressed how possibility of malignancy seems incredibly low  However, given size, persistence and some symptoms have recommended definitive surgical therapy  Patient has assented and her mother consented  for laparoscopic left ovarian cystectomy versus salpingo-oophorectomy  They understand most conservative therapy will be undertaken depending on intraoperative findings  Patient is young and healthy  Will plan to obtain same date type and screen and urine pregnancy test   No other testing is indicated  I discussed with patient indication, risks, benefits and alternatives of surgical exploration  We discussed possibility of bleeding requiring blood transfusion, life-threatening infections requiring additional procedures, injuries to surrounding organs such as bladder, ureters, gastrointestinal tract and or neurovascular structures  Additionally, we discussed general risks associated to stress of surgery such as venous thromboembolism, acute myocardial events and stroke  All questions answered to patient's satisfaction  She agrees and wants to proceed  Informed consent form signed  CHIEF COMPLAINT:       Patient referred for consultation surgical intervention given persistent left ovarian mass, complex  Patient ID: Patrice Valladares is a 23 y o  female  HPI    28-year-old G0  in her usual state of health until March 2018  At that time, she was diagnosed with pyelonephritis due to obstructive kidney stones  CT scan demonstrated incidental left adnexal mass    After completion of treatment for kidney stones this was further evaluated recently by ultrasound and subsequent MRI  This demonstrated persistent left adnexal mass now measuring approximately 6 cm with complex septations and minimal wall nodularity  No free fluid  Radiologist brings concern for possible neoplasm  There are no definitive stigmata of malignancy or metastatic disease  Patient reports left pelvic pain, moderate, occasionally radiated to left groin  Normal menstrual cycles  No changes in bowel bladder function  No intermenstrual bleeding  Review of Systems    As above  Otherwise 12 point review of systems is unremarkable  Current Outpatient Prescriptions   Medication Sig Dispense Refill    norgestimate-ethinyl estradiol (ORTHO-CYCLEN) 0 25-35 MG-MCG per tablet Take 1 tablet by mouth daily 28 tablet 3     No current facility-administered medications for this visit          Allergies   Allergen Reactions    Shellfish-Derived Products Hives       Past Medical History:   Diagnosis Date    Kidney stone     Kidney stones        Past Surgical History:   Procedure Laterality Date    MD CYSTO/URETERO W/LITHOTRIPSY &INDWELL STENT INSRT Left 2018    Procedure: CYSTOSCOPY URETEROSCOPY WITH LITHOTRIPSY HOLMIUM LASER, RETROGRADE PYELOGRAM AND INSERTION STENT URETERAL BASKET STONE EXTRACTION;  Surgeon: Perlita Cameron MD;  Location: [facility];  Service: Urology    MD CYSTOURETHROSCOPY,URETER CATHETER Left 3/29/2018    Procedure: CYSTOSCOPY RETROGRADE PYELOGRAM WITH INSERTION STENT URETERAL;  Surgeon: Dimas Epps MD;  Location: [facility];  Service: Urology       OB History      Para Term  AB Living    0 0 0 0 0 0    SAB TAB Ectopic Multiple Live Births    0 0 0 0 0        Obstetric Comments    Marche 15   Hx of BCp             Family History   Problem Relation Age of Onset    No Known Problems Mother     No Known Problems Father        The following portions of the patient's history were reviewed and updated as appropriate: allergies, current medications, past family history, past medical history, past social history, past surgical history and problem list       Objective:    Blood pressure 110/70, pulse 74, temperature 98 5 °F (36 9 °C), temperature source Oral, resp  rate 14, height 5' 7" (1 702 m), weight 71 2 kg (157 lb), last menstrual period 07/16/2018, not currently breastfeeding  Body mass index is 24 59 kg/m²  Physical Exam   Constitutional: She is oriented to person, place, and time  She appears well-developed and well-nourished  HENT:   Head: Normocephalic and atraumatic  Neck: Normal range of motion  Neck supple  No thyromegaly present  Cardiovascular: Normal rate, regular rhythm and normal heart sounds  No murmur heard  Pulmonary/Chest: Effort normal and breath sounds normal  No respiratory distress  She has no rales  Abdominal: Soft  She exhibits no distension and no mass  There is no rebound  Navel ring with no signs of infection or hernias  Genitourinary:   Genitourinary Comments:   Pelvic exam deferred  Musculoskeletal: Normal range of motion  She exhibits no edema  Lymphadenopathy:     She has no cervical adenopathy  Neurological: She is alert and oriented to person, place, and time  Skin: Skin is warm and dry  No rash noted  Psychiatric: She has a normal mood and affect  Her behavior is normal    Vitals reviewed        No results found for:   Lab Results   Component Value Date    WBC 6 87 03/31/2018    HGB 10 6 (L) 03/31/2018    HCT 31 5 (L) 03/31/2018    MCV 89 03/31/2018     03/31/2018     Lab Results   Component Value Date     03/31/2018    K 3 0 (L) 03/31/2018     03/31/2018    CO2 22 03/31/2018    ANIONGAP 11 03/31/2018    BUN 11 03/31/2018    CREATININE 1 02 03/31/2018    GLUCOSE 94 03/31/2018    CALCIUM 8 5 03/31/2018    AST 16 03/28/2018    ALT 23 03/28/2018    ALKPHOS 65 03/28/2018    PROT 7 7 03/28/2018    BILITOT 0 60 03/28/2018    EGFR 79 03/31/2018     Study Result     MRI OF THE PELVIS WITH AND WITHOUT CONTRAST (GYNECOLOGIC)     INDICATION:  Adnexal lesion     COMPARISON: Previous CT from March 28, 2018, previous the ultrasound from March 22, 2018     TECHNIQUE: The following pulse sequences were obtained on a 1 5 T scanner:  Axial T1, axial and sagittal T2, coronal T2 with fat saturation, pre-contrast axial T1 with fat saturation, post-contrast axial and sagittal T1 with fat saturation       IV Contrast:  7 mL of Gadobutrol injection (SINGLE-DOSE)      FINDINGS:     UTERUS: The uterus measures 7 7 x 3 5 x 5 6 cm  The endometrial thickness measures 4 mm  Junctional zone:  Normal in thickness  Myometrium:  Normal   Endometrium: Normal thickness without mass      ADNEXA:    Right: The right ovary measures 2 5 x 1 7 x 2 5 cm  A follicle is seen within the right ovary measuring 1 4 x 0 9 cm x 0 8 cm  Ovary normal in size and morphology  No adnexal mass identified  No evidence of hydrosalpinx         Left:  There is a complex left adnexal mass  This measures 5 1 x 3 4 x 6 3 cm  There are multiple septae seen within this mass  There is mild nodularity within this mass  There is no associated the free fluid     BLADDER: Normal      PELVIC CAVITY:  No free fluid seen  No pelvic sidewall lymph node enlargement seen     BOWEL:  Unremarkable MRI appearance      OSSEOUS STRUCTURES:   No osseous destruction      VASCULAR STRUCTURES:  Visualized vasculature is normal      PELVIC WALL:  Normal      IMPRESSION:     There is a 6 3 x 3 4 x 5 1 cm septated left adnexal/ovarian mass  There is highly suspicious for neoplasm    There is not     No pelvic sidewall lymphadenopathy  No ascites  GYN oncology evaluation suggested           Workstation performed: Kayla Gutierrez MD, [facility]  8/7/2018  4:29 PM

## 2018-08-09 NOTE — DISCHARGE INSTRUCTIONS
Ketorolac (By mouth)   Ketorolac Tromethamine (leanna-toe-ROLE-ak troe-METH-a-meen)  Treats severe pain  This medicine is an NSAID  Brand Name(s):   There may be other brand names for this medicine  When This Medicine Should Not Be Used: You should not use this medicine if you have had an allergic reaction (including asthma) to ketorolac, aspirin, or other NSAID medicines such as Aleve®, Celebrex®, Indocin®, Motrin®, or Naprosyn®  You should not use this medicine if you have a stomach ulcer, a bleeding disorder, or if you are pregnant or breastfeeding  Do not take this medicine if you have advanced kidney disease  Do not use this medicine right before or right after having coronary artery bypass graft (CABG), a type of heart surgery  You should not take this medicine if you are using probenecid (Probalan®)  How to Use This Medicine:   Tablet  · Your doctor will tell you how much medicine to use  Do not use more than directed  · Take your tablets with a full glass of water  · You may take this medicine with food or milk so it does not upset your stomach  · Use this medicine for the shortest time possible, never more than 5 days, and in the smallest dose possible  This will help lower the risk of side effects  · This medicine should come with a Medication Guide  Ask your pharmacist for a copy if you do not have one  If a dose is missed:   · Take a dose as soon as you remember  If it is almost time for your next dose, wait until then and take a regular dose  Do not take extra medicine to make up for a missed dose  How to Store and Dispose of This Medicine:   · Store the medicine in a closed container at room temperature, away from heat, moisture, and direct light  · Ask your pharmacist, doctor, or health caregiver about the best way to dispose of any outdated medicine or medicine no longer needed  · Keep all medicine out of the reach of children  Never share your medicine with anyone    Drugs and Foods to Avoid:   Ask your doctor or pharmacist before using any other medicine, including over-the-counter medicines, vitamins, and herbal products  · Do not use any other NSAID medicine unless your doctor says it is okay  Some other NSAIDs are aspirin, diclofenac, ibuprofen, naproxen, Advil®, Aleve®, Celebrex®, Ecotrin®, Motrin®, or Voltaren®  · Make sure your doctor knows if you are also using aspirin, a blood thinner (such as warfarin, Coumadin®), or a steroid medicine (such as cortisone, dexamethasone, hydrocortisone, methylprednisolone, prednisolone, prednisone, or Orapred®)  Tell your doctor if you are using methotrexate Miladys Grise), or a diuretic or "water pill" (such as furosemide, hydrochlorothiazide [HCTZ], torsemide, Demadex®, or Lasix®)  · Make sure your doctor knows if you are also using fluoxetine, heparin, lithium, thiothixene, Eskalith®, Navane®, or Prozac®  Tell your doctor if you are using a blood pressure medicine (such as enalapril, lisinopril, Accupril®, Lotensin®, Lotrel®, Monopril®, Prinivil®, Vasotec®, or Zestril®)  Your doctor will need to know if you are using medicine to treat seizures such as phenytoin (Dilantin®) or carbamazepine (Tegretol®), or sedatives such as alprazolam (Xanax®)  Warnings While Using This Medicine:   · Make sure your doctor knows if you have kidney disease, liver disease, heart disease, circulation problems, untreated high blood pressure, or a history of asthma  · If you are more than 12years of age, you should not use this medicine for more than 5 days unless your doctor has told you to  · If you are 12years of age or younger, you should not use more than a single dose  · This medicine may raise your risk of having a heart attack or stroke  This is more likely in people who already have heart disease  People who use this medicine for a long time might also have a higher risk  · This medicine may cause bleeding in your stomach or intestines   These problems can happen without warning signs  This is more likely if you have had a stomach ulcer in the past, if you smoke or drink alcohol regularly, if you are over 61years old, if you are in poor health, or if you are using certain other medicines (a steroid medicine or a blood thinner)  · This medicine may make you dizzy or drowsy  Avoid driving, using machines, or doing anything else that could be dangerous if you are not alert  Possible Side Effects While Using This Medicine:   Call your doctor right away if you notice any of these side effects:  · Allergic reaction: Itching or hives, swelling in your face or hands, swelling or tingling in your mouth or throat, chest tightness, trouble breathing  · Blistering, peeling, or red skin rash  · Bloody or black, tarry stools  · Change in how much or how often you urinate  · Chest pain, shortness of breath, or coughing up blood  · Dark-colored urine or pale stools  · Flu-like symptoms  · Numbness or weakness in your arm or leg, or on one side of your body  · Pain in your lower leg (calf)  · Problems with vision, speech, or walking  · Shortness of breath, cold sweat, and bluish-colored skin  · Skin rash or blisters with fever  · Sudden and severe stomach pain, nausea, vomiting, fever, and lightheadedness  · Sudden or severe headache  · Swelling in your hands, ankles, or feet  · Unusual bleeding or bruising  · Unusual tiredness or weakness  · Unusual weight gain  · Vomiting blood or something that looks like coffee grounds  · Yellowing of your skin or the whites of your eyes  If you notice these less serious side effects, talk with your doctor:   · Changes in your vision  · Diarrhea, constipation, or indigestion  · Headache  · Mild stomach pain  · Ringing in your ears  If you notice other side effects that you think are caused by this medicine, tell your doctor  Call your doctor for medical advice about side effects   You may report side effects to FDA at 1-800-FDA-1088  © 2017 [address] [employer] Information is for End User's use only and may not be sold, redistributed or otherwise used for commercial purposes  The above information is an  only  It is not intended as medical advice for individual conditions or treatments  Talk to your doctor, nurse or pharmacist before following any medical regimen to see if it is safe and effective for you  Laparoscopic Oophorectomy   WHAT YOU NEED TO KNOW:   A laparoscopic oophorectomy is surgery to remove one or both of your ovaries  Your healthcare provider will use a laparoscope (a thin tube with a light and tiny video camera on the end) and small tools to remove your ovaries  He may use a robot (machine) that has mechanical arms to operate the tools  This is called a robotic-assisted laparoscopic oophorectomy  DISCHARGE INSTRUCTIONS:   Call 911 for any of the following:   · You feel lightheaded, short of breath, and have chest pain  · You cough up blood  Seek care immediately if:   · Your arm or leg feels warm, tender, and painful  It may look swollen and red  · Blood soaks through your bandage  · Your stitches come apart  Contact your healthcare provider if:   · You have a fever or chills  · Your wound is red, swollen, or draining pus  · You have pus or a foul-smelling odor coming from your vagina  · You have nausea or are vomiting  · Your skin is itchy, swollen, or you have a rash  · You have trouble urinating or having a bowel movement  · You have questions or concerns about your condition or care  Medicines: You may need any of the following:  · Prescription pain medicine  may be given  Ask your healthcare provider how to take this medicine safely  · Hormone medicine  may be given if both ovaries are removed  This medicine will replace hormones in your body that your ovaries produced   You may not be started on this medicine until 2 to 3 months after surgery  · NSAIDs , such as ibuprofen, help decrease swelling, pain, and fever  NSAIDs can cause stomach bleeding or kidney problems in certain people  If you take blood thinner medicine, always ask your healthcare provider if NSAIDs are safe for you  Always read the medicine label and follow directions  · Take your medicine as directed  Contact your healthcare provider if you think your medicine is not helping or if you have side effects  Tell him or her if you are allergic to any medicine  Keep a list of the medicines, vitamins, and herbs you take  Include the amounts, and when and why you take them  Bring the list or the pill bottles to follow-up visits  Carry your medicine list with you in case of an emergency  Care for your wound as directed:  No tub baths or swimming  May shower in 24 hours  Carefully wash around the wound with soap and water  Let soap and water run over your incision  Do not  scrub your incision  Pat the area gently dry  Activity:  Ask your healthcare provider when you can return to your normal activities  Do not have sex, douche, or use tampons until your healthcare provider says it is okay  These actions may cause an infection  Do not exercise or lift anything heavy until your healthcare provider says it is okay  This may put too much stress on your incision  Get support: This surgery may be life-changing for you and your family  You will no longer be able to get pregnant if both of your ovaries are removed  Sudden changes in the levels of your hormones may occur and cause mood swings and depression  You may feel angry, sad, or frightened, or cry frequently and unexpectedly  These feelings are normal  Talk to your healthcare provider about where you can get support  Follow up with your healthcare provider as directed:  Write down your questions so you remember to ask them during your visits    © 2017 [employer] Information is for End User's use only and may not be sold, redistributed or otherwise used for commercial purposes  All illustrations and images included in CareNotes® are the copyrighted property of A D A M [employer] Inc  or Woody Zepeda  The above information is an  only  It is not intended as medical advice for individual conditions or treatments  Talk to your doctor, nurse or pharmacist before following any medical regimen to see if it is safe and effective for you

## 2018-08-09 NOTE — INTERVAL H&P NOTE
H&P reviewed  After examining the patient I find no changes in the patients condition since the H&P had been written      Frida Bowers MD, [facility]  8/9/2018  1:36 PM

## 2018-08-09 NOTE — OP NOTE
OPERATIVE REPORT  PATIENT NAME: Luda Oviedo    :  1995  MRN: [id number]  Pt Location: [facility] OR ROOM 14    SURGERY DATE: 2018    Surgeon(s) and Role:     * Tio Nolen MD - Primary     * Lc Vick MD - Assisting     * Marylou Rose MD - Assisting     * Ludwin Epstein DO - Virgilio    Preop Diagnosis:  Ovarian mass, left [N83 9]    Post-Op Diagnosis Codes:     * Ovarian mass, left [N83 9]    Procedure(s) (LRB):  LAPAROSCOPIC LEFT OOPHORECTOMY (Left)    Specimen(s):  ID Type Source Tests Collected by Time Destination   1 :  Tissue Ovary, Left TISSUE EXAM Tio Nolen MD 2018 1433        Estimated Blood Loss:   Minimal    Drains:  Ureteral Drain/Stent Left ureter 6 Fr  (Active)   Number of days: 133       [REMOVED] NG/OG/Enteral Tube Orogastric Center mouth (Removed)   Status Suction-low intermittent 2018  2:38 PM   Number of days: 0       [REMOVED] Urethral Catheter Non-latex 16 Fr  (Removed)   Number of days: 0       Anesthesia Type:   General    Operative Indications:  Patient with persistent symptomatic left adnexal mass  MRI described findings concerning for potential neoplasm  After counseling regarding indications, risks, benefits and alternatives of surgical excision her parents consented for the procedure and the patient assented and wanted to proceed  Operative Findings:  1  Approximately 7 cm complex left ovarian cystic mass with at least 1 surface nodular component measuring approximately 1 cm  Internal surface smooth without excrescences or nodularity is  2   Grossly normal left fallopian tube remained in situ and was noted to be normal and viable at the end of the procedure  3   Grossly normal right fallopian tube and ovary  4   No evidence of extra ovarian disease or other intraperitoneal abnormalities noted              Complications:   None    Procedure and Technique:  The patient was taken to the operating room where general endotracheal anesthesia was induced without complications  Sequential compression devices were activated and medical deep vein thrombosis prophylaxis was administered per hospital protocol  The patient was placed in the dorsal lithotomy position using Yasmani stirrups and her abdomen, perineum and vagina were prepped and draped in the usual sterile fashion  Under direct visualization a uterine dilator and tenaculum were secured in place for uterine manipulation  A Cyr catheter was inserted  Attention was turned to the abdomen  An 11mm skin incision was made at the base of the umbilicus with an 11 blade knife  Please note that this and all other incisions were infiltrated with 0 25% bupivacaine at the beginning and completion of the procedure  Using a 5 mm Endopath Excel trocar and a 5 mm laparoscope the peritoneal cavity was entered under direct visualization  Good intraperitoneal location was confirmed and pneumoperitoneum was created to maximal pressure of 15 mmHg  The patient was placed in steep Trendelenburg and the above-mentioned findings were noted  Two 5 mm trocars were placedin the right and left lower quadrants and the umbilical port was up sized to an 11 mm cannula under direct visualization without incident  The ureters were clearly identified on both sides  Above-mentioned findings were noted  It appeared clear that the ovarian mass was complex and would require complete excision given MRI findings  The fallopian tube was normal and could be easily  from the ovarian mass without incident  The fimbrial end of fallopian tube was elevated and loose adhesions were released using sharp dissection  Then, the mesosalpinx was divided serially using the EnSeal device maintaining the tubal arcade intact for adequate viability  The left utero-ovarian ligament was cauterized and divided    The anterior and posterior leaves of the broad ligament were then divided with clear attention to avoid the ureter and all left pelvic sidewall structures  The left ovarian vessels were then skeletonized cauterized and divided with the EnSeal device  The mass was retrieved in a nylon bag in which the mass was decompressed and then extracted through the umbilicus without difficulties  Excellent hemostasis was confirmed at low intraperitoneal pressures  The fascia at the umbilical trocar site was closed using 2 figure-of-eight stitches of zero Vicryl   Laparoscopic assessment revealed good airtight umbilical trocar site closure  All trocars were removed and pneumoperitoneum was completely released with the assistance of Valsalva maneuvers  The skin at all port sites was closed using a subcuticular stitch of 4-0 Monocryl and Histoacryl was applied  Uterine manipulation instruments were removed and vaginal exam revealed excellent hemostasis  The Cyr catheter was removed  The patient tolerated the procedure well, sponge, lap, needle and instrument counts were all reported as correct ×2  The patient was successfully extubated and awakened in the operating room and transferred to the postanesthesia care unit in stable condition       I was present for the entire procedure    Patient Disposition:  PACU     SIGNATURE: Cait Sawant MD, Hever Kumar  DATE: August 9, 2018  TIME: 2:54 PM

## 2018-08-09 NOTE — ANESTHESIA POSTPROCEDURE EVALUATION
Post-Op Assessment Note      CV Status:  Stable    Mental Status:  Alert and awake    Hydration Status:  Euvolemic and stable    PONV Controlled:  None    Airway Patency:  Patent    Post Op Vitals Reviewed: Yes          Staff: Anesthesiologist           /83 (08/09/18 1458)    Temp (!) 97 3 °F (36 3 °C) (08/09/18 1458)    Pulse 68 (08/09/18 1458)   Resp 15 (08/09/18 1458)    SpO2 100 % (08/09/18 1458)      Patient transported to PACU extubated and on supplemental O2  Patient was awake and alert, with stable vital signs  Signout was given to PACU RN

## 2018-08-24 ENCOUNTER — OFFICE VISIT (OUTPATIENT)
Dept: FAMILY MEDICINE CLINIC | Facility: CLINIC | Age: 23
End: 2018-08-24
Payer: COMMERCIAL

## 2018-08-24 VITALS
HEART RATE: 71 BPM | DIASTOLIC BLOOD PRESSURE: 64 MMHG | BODY MASS INDEX: 24.33 KG/M2 | OXYGEN SATURATION: 97 % | TEMPERATURE: 97 F | RESPIRATION RATE: 16 BRPM | WEIGHT: 155 LBS | SYSTOLIC BLOOD PRESSURE: 116 MMHG | HEIGHT: 67 IN

## 2018-08-24 DIAGNOSIS — Z02.5 SPORTS PHYSICAL: Primary | ICD-10-CM

## 2018-08-24 PROCEDURE — 99213 OFFICE O/P EST LOW 20 MIN: CPT | Performed by: FAMILY MEDICINE

## 2018-08-24 NOTE — PROGRESS NOTES
[address]       NAME: Joe Foster is a 26 y o  female  : 1995    MRN: [id number]  DATE: 2018  TIME: 11:34 AM    Assessment and Plan   Diagnoses and all orders for this visit:    Sports physical        No problem-specific Assessment & Plan notes found for this encounter  Patient Instructions           Chief Complaint     Chief Complaint   Patient presents with    Physical Exam         History of Present Illness       26year old female at office today with no chief complaints or concerns here for sports physical          Review of Systems   Review of Systems   Constitutional: Negative  HENT: Negative  Eyes: Negative  Respiratory: Negative  Cardiovascular: Negative  Gastrointestinal: Negative  Endocrine: Negative  Genitourinary: Negative  Musculoskeletal: Negative  Skin: Negative  Allergic/Immunologic: Negative  Neurological: Negative  Hematological: Negative  Psychiatric/Behavioral: Negative  All other systems reviewed and are negative          Current Medications       Current Outpatient Prescriptions:     norgestimate-ethinyl estradiol (ORTHO-CYCLEN) 0 25-35 MG-MCG per tablet, Take 1 tablet by mouth daily, Disp: 28 tablet, Rfl: 3    Current Allergies     Allergies as of 2018 - Reviewed 2018   Allergen Reaction Noted    Shellfish-derived products Hives 2018            The following portions of the patient's history were reviewed and updated as appropriate: allergies, current medications, past family history, past medical history, past social history, past surgical history and problem list      Past Medical History:   Diagnosis Date    Kidney stone     Kidney stones        Past Surgical History:   Procedure Laterality Date    VT CYSTO/URETERO W/LITHOTRIPSY &INDWELL STENT INSRT Left 2018    Procedure: CYSTOSCOPY URETEROSCOPY WITH LITHOTRIPSY HOLMIUM LASER, RETROGRADE PYELOGRAM AND INSERTION STENT URETERAL BASKET STONE EXTRACTION;  Surgeon: Jose Elias Hogue MD;  Location: [facility];  Service: Urology    IL CYSTOURETHROSCOPY,URETER CATHETER Left 3/29/2018    Procedure: CYSTOSCOPY RETROGRADE PYELOGRAM WITH INSERTION STENT URETERAL;  Surgeon: Kamila Rainey MD;  Location: [facility];  Service: Urology    IL LAP,RMV  ADNEXAL STRUCTURE Left 8/9/2018    Procedure: LAPAROSCOPIC LEFT OOPHORECTOMY;  Surgeon: Kezia Balbuena MD;  Location: [facility];  Service: Gynecology Oncology       Family History   Problem Relation Age of Onset    No Known Problems Mother     No Known Problems Father          Medications have been verified  Objective   /64 (BP Location: Left arm, Patient Position: Sitting, Cuff Size: Standard)   Pulse 71   Temp (!) 97 °F (36 1 °C) (Oral)   Resp 16   Ht 5' 6 5" (1 689 m)   Wt 70 3 kg (155 lb)   SpO2 97%   BMI 24 64 kg/m²        Physical Exam     Physical Exam   Constitutional: She is oriented to person, place, and time  Vital signs are normal  She appears well-developed  HENT:   Head: Normocephalic and atraumatic  Right Ear: External ear normal    Left Ear: External ear normal    Nose: Nose normal    Mouth/Throat: Oropharynx is clear and moist    Eyes: Conjunctivae and EOM are normal  Pupils are equal, round, and reactive to light  Lids are everted and swept, no foreign bodies found  Neck: Normal range of motion  Neck supple  Cardiovascular: Normal rate, regular rhythm, normal heart sounds and intact distal pulses  Pulmonary/Chest: Effort normal and breath sounds normal    Abdominal: Soft  Normal appearance and bowel sounds are normal    Musculoskeletal: Normal range of motion  Neurological: She is alert and oriented to person, place, and time  She has normal reflexes  Skin: Skin is warm, dry and intact  Psychiatric: She has a normal mood and affect   Her speech is normal and behavior is normal  Judgment and thought content normal    Nursing note and vitals reviewed

## 2018-08-28 ENCOUNTER — OFFICE VISIT (OUTPATIENT)
Dept: GYNECOLOGIC ONCOLOGY | Facility: CLINIC | Age: 23
End: 2018-08-28

## 2018-08-28 VITALS
RESPIRATION RATE: 16 BRPM | SYSTOLIC BLOOD PRESSURE: 132 MMHG | BODY MASS INDEX: 24.8 KG/M2 | DIASTOLIC BLOOD PRESSURE: 72 MMHG | WEIGHT: 158 LBS | HEIGHT: 67 IN

## 2018-08-28 DIAGNOSIS — Z90.721 S/P LEFT OOPHORECTOMY: Primary | ICD-10-CM

## 2018-08-28 PROBLEM — N83.8 OVARIAN MASS, LEFT: Status: RESOLVED | Noted: 2018-08-07 | Resolved: 2018-08-28

## 2018-08-28 PROCEDURE — 99024 POSTOP FOLLOW-UP VISIT: CPT | Performed by: OBSTETRICS & GYNECOLOGY

## 2018-08-28 NOTE — ASSESSMENT & PLAN NOTE
-   Status post laparoscopic left oophorectomy on August 9, 2018  Final pathology demonstrated cystadenofibroma  Patient has recovered well  I have encouraged her to continue with no heavy lifting to complete 4 weeks to prevent abdominal hernias  She can progressively resume all activities of daily living and she will contact me if she has any questions or problems related to surgery  She can resume routine healthcare maintenance

## 2018-08-28 NOTE — PROGRESS NOTES
Assessment/Plan:    Problem List Items Addressed This Visit        Other    S/P left oophorectomy - Primary     -   Status post laparoscopic left oophorectomy on August 9, 2018  Final pathology demonstrated cystadenofibroma  Patient has recovered well  I have encouraged her to continue with no heavy lifting to complete 4 weeks to prevent abdominal hernias  She can progressively resume all activities of daily living and she will contact me if she has any questions or problems related to surgery  She can resume routine healthcare maintenance  CHIEF COMPLAINT:       Postoperative follow-up  Patient ID: Angel Bedolla is a 26 y o  female  HPI    Patient status post laparoscopic left oophorectomy on August 9, 2018  She has recovered well  She is asymptomatic  Final pathology demonstrated benign cystadenofibroma  The following portions of the patient's history were reviewed and updated as appropriate: allergies, current medications, past family history, past medical history, past social history, past surgical history and problem list     Review of Systems    Twelve point review of systems is unremarkable  Current Outpatient Prescriptions:     norgestimate-ethinyl estradiol (ORTHO-CYCLEN) 0 25-35 MG-MCG per tablet, Take 1 tablet by mouth daily, Disp: 28 tablet, Rfl: 3    Objective:    Blood pressure 132/72, resp  rate 16, height 5' 6 5" (1 689 m), weight 71 7 kg (158 lb), last menstrual period 08/13/2018, not currently breastfeeding  Body mass index is 25 12 kg/m²  Body surface area is 1 82 meters squared  Physical Exam    Abdomen:  Incisions well-healed  Umbilical incision with minimal skin edema  No dehiscence  No hernias      Results   Tissue Exam (Order [id number])   Result Information     Abnormality Status Priority Source    Final result (8/14/2018  8:39 AM) Routine Ovary, Left   Other Results from 8/9/2018      CBC and Platelet  Final result 8/9/2018    Type and screen Edited Result - FINAL 8/9/2018    POCT pregnancy, urine  Final result 8/9/2018   Case Report     Case Report   Surgical Pathology Report                         Case: [id number]                                    Authorizing Provider: Jeffrey Mandujano MD      Collected:           08/09/2018 1433               Ordering Location:     [address]      Received:            08/09/2018 [address] [place] [address] Operating Room                                                       Pathologist:           Hannah Rm MD                                                             Specimen:    Ovary, Left                                                                                Final Diagnosis   A  Left ovary:  - Serous cystadenofibroma  - Multiple follicle cysts  - No atypia or malignancy is seen      Interpretation performed at [facility], [address] [place] [address] [address] [facility]      Electronically signed by Hannah Rm MD on 8/14/2018 at  8:39 AM   Additional Information   All controls performed with the immunohistochemical stains reported above reacted appropriately  These tests were developed and their performance characteristics determined by [facility] or [facility]  They may not be cleared or approved by the [place]  Food and Drug Administration  The FDA has determined that such clearance or approval is not necessary  These tests are used for clinical purposes  They should not be regarded as investigational or for research  This laboratory has been approved by CLIA 88, designated as a high-complexity laboratory and is qualified to perform these tests  Gross Description   A  The specimen is received in formalin, labeled with the patient's name and hospital number, and is designated "left ovary    The specimen consists of a partially ragged and ruptured cystically dilated ovary which upon reconstruction measures approximately 6 4 x 4 4 x 2 4 cm  The outer surface is inked blue  The white tan-pink smooth outer surface exhibits an attached white tan dense nodule which measures 1 0 x 0 8 x 0 4 cm  The lumen appears multiloculated and smooth lined; some of the cystic structures release clear fluid  Representative sections  Seven cassettes, including entire external surface nodule bisected in cassettes 1, 2      Note: The estimated total formalin fixation time based upon information provided by the submitting clinician and the standard processing schedule is under 72 hours    Vaughn Muro MD, [place], [place]  8/28/2018  12:51 PM

## 2018-08-28 NOTE — LETTER
August 28, 2018     Alla Shea PA-C  [address] [address]    Patient: Ángel Johnson   YOB: 1995   Date of Visit: 8/28/2018       Dear Dr Valerio Quinn: Thank you for referring Ángel Johnson to me for evaluation  Below are my notes for this consultation  If you have questions, please do not hesitate to call me  I look forward to following your patient along with you  Sincerely,        Mukesh Kellogg MD        CC: No Recipients  Mukesh Kellogg MD  8/28/2018 12:51 PM  Sign at close encounter  Assessment/Plan:    Problem List Items Addressed This Visit        Other    S/P left oophorectomy - Primary     -   Status post laparoscopic left oophorectomy on August 9, 2018  Final pathology demonstrated cystadenofibroma  Patient has recovered well  I have encouraged her to continue with no heavy lifting to complete 4 weeks to prevent abdominal hernias  She can progressively resume all activities of daily living and she will contact me if she has any questions or problems related to surgery  She can resume routine healthcare maintenance  CHIEF COMPLAINT:       Postoperative follow-up  Patient ID: Ángel Johnson is a 26 y o  female  HPI    Patient status post laparoscopic left oophorectomy on August 9, 2018  She has recovered well  She is asymptomatic  Final pathology demonstrated benign cystadenofibroma  The following portions of the patient's history were reviewed and updated as appropriate: allergies, current medications, past family history, past medical history, past social history, past surgical history and problem list     Review of Systems    Twelve point review of systems is unremarkable  Current Outpatient Prescriptions:     norgestimate-ethinyl estradiol (ORTHO-CYCLEN) 0 25-35 MG-MCG per tablet, Take 1 tablet by mouth daily, Disp: 28 tablet, Rfl: 3    Objective:    Blood pressure 132/72, resp   rate 16, height 5' 6 5" (1 689 m), weight 71 7 kg (158 lb), last menstrual period 08/13/2018, not currently breastfeeding  Body mass index is 25 12 kg/m²  Body surface area is 1 82 meters squared  Physical Exam    Abdomen:  Incisions well-healed  Umbilical incision with minimal skin edema  No dehiscence  No hernias  Results   Tissue Exam (Order [id number])   Result Information     Abnormality Status Priority Source    Final result (8/14/2018  8:39 AM) Routine Ovary, Left   Other Results from 8/9/2018      CBC and Platelet  Final result 8/9/2018    Type and screen  Edited Result - FINAL 8/9/2018    POCT pregnancy, urine  Final result 8/9/2018   Case Report     Case Report   Surgical Pathology Report                         Case: [id number]                                    Authorizing Provider: Guillermina Velasquez MD      Collected:           08/09/2018 1433               Ordering Location:     [address]      Received:            08/09/2018 [address] [place] [address] Operating Room                                                       Pathologist:           Clayton Hopkins MD                                                             Specimen:    Ovary, Left                                                                                Final Diagnosis   A  Left ovary:  - Serous cystadenofibroma  - Multiple follicle cysts  - No atypia or malignancy is seen      Interpretation performed at [address]      Electronically signed by Clayton Hopkins MD on 8/14/2018 at  8:39 AM   Additional Information   All controls performed with the immunohistochemical stains reported above reacted appropriately  These tests were developed and their performance characteristics determined by [facility] or [facility]  They may not be cleared or approved by the [place]  Food and Drug Administration   The FDA has determined that such clearance or approval is not necessary  These tests are used for clinical purposes  They should not be regarded as investigational or for research  This laboratory has been approved by [facility] 88, designated as a high-complexity laboratory and is qualified to perform these tests  Gross Description   A  The specimen is received in formalin, labeled with the patient's name and hospital number, and is designated "left ovary  The specimen consists of a partially ragged and ruptured cystically dilated ovary which upon reconstruction measures approximately 6 4 x 4 4 x 2 4 cm  The outer surface is inked blue  The white tan-pink smooth outer surface exhibits an attached white tan dense nodule which measures 1 0 x 0 8 x 0 4 cm  The lumen appears multiloculated and smooth lined; some of the cystic structures release clear fluid  Representative sections  Seven cassettes, including entire external surface nodule bisected in cassettes 1, 2      Note: The estimated total formalin fixation time based upon information provided by the submitting clinician and the standard processing schedule is under 72 hours    Solange Sharpe MD, [place]  8/28/2018  12:51 PM

## 2018-10-10 DIAGNOSIS — Z13.9 SCREENING FOR CONDITION: Primary | ICD-10-CM

## 2019-08-28 DIAGNOSIS — N83.202 CYST OF LEFT OVARY: ICD-10-CM

## 2019-08-29 RX ORDER — NORGESTIMATE AND ETHINYL ESTRADIOL 0.25-0.035
KIT ORAL
Qty: 28 TABLET | Refills: 3 | OUTPATIENT
Start: 2019-08-29

## 2020-08-19 ENCOUNTER — OFFICE VISIT (OUTPATIENT)
Dept: FAMILY MEDICINE CLINIC | Facility: CLINIC | Age: 25
End: 2020-08-19
Payer: COMMERCIAL

## 2020-08-19 VITALS
SYSTOLIC BLOOD PRESSURE: 126 MMHG | DIASTOLIC BLOOD PRESSURE: 88 MMHG | WEIGHT: 181.2 LBS | HEART RATE: 77 BPM | HEIGHT: 67 IN | OXYGEN SATURATION: 99 % | TEMPERATURE: 98.2 F | BODY MASS INDEX: 28.44 KG/M2 | RESPIRATION RATE: 18 BRPM

## 2020-08-19 DIAGNOSIS — Z11.1 ENCOUNTER FOR PPD TEST: ICD-10-CM

## 2020-08-19 DIAGNOSIS — Z12.4 SCREENING FOR CERVICAL CANCER: ICD-10-CM

## 2020-08-19 DIAGNOSIS — Z00.00 ANNUAL PHYSICAL EXAM: Primary | ICD-10-CM

## 2020-08-19 DIAGNOSIS — H61.23 BILATERAL IMPACTED CERUMEN: ICD-10-CM

## 2020-08-19 PROCEDURE — 86580 TB INTRADERMAL TEST: CPT | Performed by: FAMILY MEDICINE

## 2020-08-19 PROCEDURE — 99395 PREV VISIT EST AGE 18-39: CPT | Performed by: FAMILY MEDICINE

## 2020-08-19 NOTE — PROGRESS NOTES
[facility]    NAME: Francine Cummings  AGE: 23 y o  SEX: female  : 1995     DATE: 2020     Assessment and Plan:     Problem List Items Addressed This Visit     None      Visit Diagnoses     Annual physical exam    -  Primary    Encounter for PPD test        Relevant Orders    TB Skin Test (aka PPD)    Screening for cervical cancer        Relevant Orders    Ambulatory referral to Gynecology    Bilateral impacted cerumen        Relevant Medications    carbamide peroxide (DEBROX) 6 5 % otic solution        - Return in 2 days for PPD read  - Debrox drops for bilateral cerumen  Avoid Qtips  Follow up if experiencing ear pain or hearing loss  Immunizations and preventive care screenings were discussed with patient today  Appropriate education was printed on patient's after visit summary  Counseling:  Alcohol/drug use: discussed moderation in alcohol intake, the recommendations for healthy alcohol use, and avoidance of illicit drug use  Dental Health: discussed importance of regular tooth brushing, flossing, and dental visits  Injury prevention: discussed safety/seat belts, safety helmets, smoke detectors, carbon dioxide detectors, and smoking near bedding or upholstery  Sexual health: discussed sexually transmitted diseases, partner selection, use of condoms, avoidance of unintended pregnancy, and contraceptive alternatives  · Exercise: the importance of regular exercise/physical activity was discussed  Recommend exercise 3-5 times per week for at least 30 minutes  BMI Counseling: Body mass index is 28 38 kg/m²   The BMI is above normal  Nutrition recommendations include decreasing portion sizes, encouraging healthy choices of fruits and vegetables, decreasing fast food intake, consuming healthier snacks, limiting drinks that contain sugar, moderation in carbohydrate intake, increasing intake of lean protein, reducing intake of saturated and trans fat and reducing intake of cholesterol  Exercise recommendations include moderate physical activity 150 minutes/week, vigorous physical activity 75 minutes/week, exercising 3-5 times per week, obtaining a gym membership and strength training exercises  No pharmacotherapy was ordered  No follow-ups on file  Chief Complaint:     Chief Complaint   Patient presents with    Annual Exam     with TB test      History of Present Illness:     Adult Annual Physical   Patient here for a comprehensive physical exam  The patient reports no problems  Diet and Physical Activity  · Diet/Nutrition: well balanced diet, limited junk food, consuming 3-5 servings of fruits/vegetables daily, adequate fiber intake and adequate whole grain intake  · Exercise: moderate cardiovascular exercise, 3-4 times a week on average and 30-60 minutes on average  Depression Screening  PHQ-9 Depression Screening    PHQ-9:    Frequency of the following problems over the past two weeks:       Little interest or pleasure in doing things:  0 - not at all  Feeling down, depressed, or hopeless:  0 - not at all  PHQ-2 Score:  0       General Health  · Sleep: sleeps well and gets 7-8 hours of sleep on average  · Hearing: normal - bilateral   · Vision: no vision problems, goes for regular eye exams, most recent eye exam <1 year ago and wears glasses and contacts  · Dental: regular dental visits, brushes teeth twice daily and flosses teeth occasionally  /GYN Health  · Last menstrual period: 8/14  · Contraceptive method: oral contraceptives  · History of STDs?: no      Review of Systems:     Review of Systems   Constitutional: Negative for appetite change, chills, diaphoresis, fever and unexpected weight change  HENT: Negative for congestion, ear pain, postnasal drip, rhinorrhea, sinus pressure, sinus pain and sore throat      Eyes: Negative for photophobia, pain, discharge, redness, itching and visual disturbance  Respiratory: Negative for cough, chest tightness, shortness of breath and wheezing  Cardiovascular: Negative for chest pain, palpitations and leg swelling  Gastrointestinal: Negative for abdominal pain, constipation, diarrhea, nausea and vomiting  Genitourinary: Negative for difficulty urinating, dysuria, frequency, hematuria, menstrual problem, urgency and vaginal discharge  Musculoskeletal: Negative for arthralgias, back pain, gait problem, joint swelling, myalgias, neck pain and neck stiffness  Skin: Negative for rash and wound  Neurological: Negative for dizziness, seizures, syncope, weakness, light-headedness and headaches  Psychiatric/Behavioral: Negative for dysphoric mood, self-injury, sleep disturbance and suicidal ideas  The patient is not nervous/anxious         Past Medical History:     Past Medical History:   Diagnosis Date    Kidney stone     Kidney stones       Past Surgical History:     Past Surgical History:   Procedure Laterality Date    MS CYSTO/URETERO W/LITHOTRIPSY &INDWELL STENT INSRT Left 5/24/2018    Procedure: CYSTOSCOPY URETEROSCOPY WITH LITHOTRIPSY HOLMIUM LASER, RETROGRADE PYELOGRAM AND INSERTION STENT URETERAL BASKET STONE EXTRACTION;  Surgeon: Lane Rivera MD;  Location: [facility];  Service: Urology    MS CYSTOURETHROSCOPY,URETER CATHETER Left 3/29/2018    Procedure: CYSTOSCOPY RETROGRADE PYELOGRAM WITH INSERTION STENT URETERAL;  Surgeon: Sakina Inman MD;  Location: [facility];  Service: Urology    MS LAP,RMV  ADNEXAL STRUCTURE Left 8/9/2018    Procedure: LAPAROSCOPIC LEFT OOPHORECTOMY;  Surgeon: Sanya Cabrera MD;  Location: [facility];  Service: Gynecology Oncology      Social History:     E-Cigarette/Vaping    E-Cigarette Use Never User      E-Cigarette/Vaping Substances    Nicotine No     THC No     CBD No     Flavoring No     Other No     Unknown No      Social History     Socioeconomic History  Marital status: Single     Spouse name: None    Number of children: None    Years of education: None    Highest education level: None   Occupational History    None   Social Needs    Financial resource strain: None    Food insecurity     Worry: None     Inability: None    Transportation needs     Medical: None     Non-medical: None   Tobacco Use    Smoking status: Never Smoker    Smokeless tobacco: Never Used   Substance and Sexual Activity    Alcohol use: Yes     Comment: occasionally    Drug use: No    Sexual activity: Yes     Partners: Male   Lifestyle    Physical activity     Days per week: None     Minutes per session: None    Stress: None   Relationships    Social connections     Talks on phone: None     Gets together: None     Attends Mandaeism service: None     Active member of club or organization: None     Attends meetings of clubs or organizations: None     Relationship status: None    Intimate partner violence     Fear of current or ex partner: None     Emotionally abused: None     Physically abused: None     Forced sexual activity: None   Other Topics Concern    None   Social History Narrative    None      Family History:     Family History   Problem Relation Age of Onset    No Known Problems Mother     No Known Problems Father       Current Medications:     Current Outpatient Medications   Medication Sig Dispense Refill    norgestimate-ethinyl estradiol (ORTHO-CYCLEN) 0 25-35 MG-MCG per tablet Take 1 tablet by mouth daily 28 tablet 3    carbamide peroxide (DEBROX) 6 5 % otic solution Administer 5 drops into both ears 2 (two) times a day 15 mL 0     No current facility-administered medications for this visit  Allergies:      Allergies   Allergen Reactions    Shellfish-Derived Products Hives      Physical Exam:     /88 (BP Location: Left arm, Patient Position: Sitting, Cuff Size: Adult)   Pulse 77   Temp 98 2 °F (36 8 °C) (Tympanic)   Resp 18   Ht 5' 7" (1 702 m) Wt 82 2 kg (181 lb 3 2 oz)   SpO2 99%   BMI 28 38 kg/m²     Physical Exam  Vitals signs and nursing note reviewed  Constitutional:       General: She is not in acute distress  Appearance: Normal appearance  HENT:      Head: Normocephalic and atraumatic  Right Ear: External ear normal  There is impacted cerumen  Left Ear: External ear normal  There is impacted cerumen  Nose: Nose normal       Mouth/Throat:      Mouth: Mucous membranes are moist       Pharynx: Oropharynx is clear  No oropharyngeal exudate  Eyes:      Extraocular Movements: Extraocular movements intact  Conjunctiva/sclera: Conjunctivae normal       Pupils: Pupils are equal, round, and reactive to light  Neck:      Musculoskeletal: Normal range of motion and neck supple  Cardiovascular:      Rate and Rhythm: Normal rate and regular rhythm  Heart sounds: Normal heart sounds  No murmur  Pulmonary:      Effort: Pulmonary effort is normal  No respiratory distress  Breath sounds: Normal breath sounds  No wheezing  Musculoskeletal: Normal range of motion  General: No swelling  Lymphadenopathy:      Cervical: No cervical adenopathy  Skin:     General: Skin is warm and dry  Neurological:      General: No focal deficit present  Mental Status: She is alert and oriented to person, place, and time  Cranial Nerves: No cranial nerve deficit  Sensory: No sensory deficit  Motor: No weakness        Coordination: Coordination normal       Gait: Gait normal    Psychiatric:         Mood and Affect: Mood normal          Behavior: Behavior normal           Sancho Salamanca, [address] [facility]

## 2020-08-19 NOTE — PATIENT INSTRUCTIONS

## 2020-08-21 ENCOUNTER — CLINICAL SUPPORT (OUTPATIENT)
Dept: FAMILY MEDICINE CLINIC | Facility: CLINIC | Age: 25
End: 2020-08-21

## 2020-08-21 DIAGNOSIS — Z23 NEED FOR TUBERCULOSIS VACCINATION: Primary | ICD-10-CM

## 2020-08-21 LAB
INDURATION: 0 MM
TB SKIN TEST: NEGATIVE

## 2020-10-21 ENCOUNTER — ANNUAL EXAM (OUTPATIENT)
Dept: OBGYN CLINIC | Facility: MEDICAL CENTER | Age: 25
End: 2020-10-21
Payer: COMMERCIAL

## 2020-10-21 VITALS — SYSTOLIC BLOOD PRESSURE: 125 MMHG | DIASTOLIC BLOOD PRESSURE: 75 MMHG | BODY MASS INDEX: 27.93 KG/M2 | WEIGHT: 178.3 LBS

## 2020-10-21 DIAGNOSIS — N83.202 CYST OF LEFT OVARY: ICD-10-CM

## 2020-10-21 DIAGNOSIS — Z01.419 ENCOUNTER FOR GYNECOLOGICAL EXAMINATION: Primary | ICD-10-CM

## 2020-10-21 PROCEDURE — G0124 SCREEN C/V THIN LAYER BY MD: HCPCS | Performed by: PATHOLOGY

## 2020-10-21 PROCEDURE — 87624 HPV HI-RISK TYP POOLED RSLT: CPT | Performed by: OBSTETRICS & GYNECOLOGY

## 2020-10-21 PROCEDURE — S0612 ANNUAL GYNECOLOGICAL EXAMINA: HCPCS | Performed by: OBSTETRICS & GYNECOLOGY

## 2020-10-21 PROCEDURE — G0145 SCR C/V CYTO,THINLAYER,RESCR: HCPCS | Performed by: PATHOLOGY

## 2020-10-21 RX ORDER — NORGESTIMATE AND ETHINYL ESTRADIOL 0.25-0.035
1 KIT ORAL DAILY
Qty: 84 TABLET | Refills: 3 | Status: SHIPPED | OUTPATIENT
Start: 2020-10-21 | End: 2021-11-01

## 2020-10-30 LAB
LAB AP GYN PRIMARY INTERPRETATION: ABNORMAL
Lab: ABNORMAL
PATH INTERP SPEC-IMP: ABNORMAL

## 2020-11-04 LAB
HPV HR 12 DNA CVX QL NAA+PROBE: NEGATIVE
HPV16 DNA CVX QL NAA+PROBE: NEGATIVE
HPV18 DNA CVX QL NAA+PROBE: NEGATIVE

## 2021-03-29 ENCOUNTER — CLINICAL SUPPORT (OUTPATIENT)
Dept: FAMILY MEDICINE CLINIC | Facility: CLINIC | Age: 26
End: 2021-03-29
Payer: COMMERCIAL

## 2021-03-29 DIAGNOSIS — Z11.1 ENCOUNTER FOR PPD TEST: Primary | ICD-10-CM

## 2021-03-29 PROCEDURE — 86580 TB INTRADERMAL TEST: CPT | Performed by: FAMILY MEDICINE

## 2021-03-31 ENCOUNTER — CLINICAL SUPPORT (OUTPATIENT)
Dept: FAMILY MEDICINE CLINIC | Facility: CLINIC | Age: 26
End: 2021-03-31

## 2021-03-31 DIAGNOSIS — Z11.1 ENCOUNTER FOR PPD SKIN TEST READING: Primary | ICD-10-CM

## 2021-03-31 LAB
INDURATION: 0 MM
TB SKIN TEST: NEGATIVE

## 2021-05-14 ENCOUNTER — IMMUNIZATIONS (OUTPATIENT)
Dept: FAMILY MEDICINE CLINIC | Facility: HOSPITAL | Age: 26
End: 2021-05-14

## 2021-05-14 DIAGNOSIS — Z23 ENCOUNTER FOR IMMUNIZATION: Primary | ICD-10-CM

## 2021-05-14 PROCEDURE — 0011A SARS-COV-2 / COVID-19 MRNA VACCINE (MODERNA) 100 MCG: CPT

## 2021-05-14 PROCEDURE — 91301 SARS-COV-2 / COVID-19 MRNA VACCINE (MODERNA) 100 MCG: CPT

## 2021-06-24 ENCOUNTER — IMMUNIZATIONS (OUTPATIENT)
Dept: FAMILY MEDICINE CLINIC | Facility: HOSPITAL | Age: 26
End: 2021-06-24

## 2021-06-24 DIAGNOSIS — Z23 ENCOUNTER FOR IMMUNIZATION: Primary | ICD-10-CM

## 2021-06-24 PROCEDURE — 91301 SARS-COV-2 / COVID-19 MRNA VACCINE (MODERNA) 100 MCG: CPT

## 2021-06-24 PROCEDURE — 0012A SARS-COV-2 / COVID-19 MRNA VACCINE (MODERNA) 100 MCG: CPT

## 2021-10-29 DIAGNOSIS — N83.202 CYST OF LEFT OVARY: ICD-10-CM

## 2021-11-01 RX ORDER — NORGESTIMATE AND ETHINYL ESTRADIOL 0.25-0.035
KIT ORAL
Qty: 84 TABLET | Refills: 3 | Status: SHIPPED | OUTPATIENT
Start: 2021-11-01 | End: 2021-12-16 | Stop reason: SDUPTHER

## 2021-12-16 ENCOUNTER — ANNUAL EXAM (OUTPATIENT)
Dept: OBGYN CLINIC | Facility: MEDICAL CENTER | Age: 26
End: 2021-12-16
Payer: COMMERCIAL

## 2021-12-16 VITALS — WEIGHT: 161 LBS | DIASTOLIC BLOOD PRESSURE: 80 MMHG | SYSTOLIC BLOOD PRESSURE: 140 MMHG | BODY MASS INDEX: 25.22 KG/M2

## 2021-12-16 DIAGNOSIS — R87.610 ASCUS OF CERVIX WITH NEGATIVE HIGH RISK HPV: ICD-10-CM

## 2021-12-16 DIAGNOSIS — N83.202 CYST OF LEFT OVARY: ICD-10-CM

## 2021-12-16 DIAGNOSIS — Z01.419 ENCOUNTER FOR GYNECOLOGICAL EXAMINATION: Primary | ICD-10-CM

## 2021-12-16 DIAGNOSIS — Z78.9 USES BIRTH CONTROL: ICD-10-CM

## 2021-12-16 PROCEDURE — S0612 ANNUAL GYNECOLOGICAL EXAMINA: HCPCS | Performed by: OBSTETRICS & GYNECOLOGY

## 2021-12-16 RX ORDER — NORGESTIMATE AND ETHINYL ESTRADIOL 0.25-0.035
1 KIT ORAL DAILY
Qty: 84 TABLET | Refills: 3 | Status: SHIPPED | OUTPATIENT
Start: 2021-12-16

## 2022-02-07 ENCOUNTER — TELEPHONE (OUTPATIENT)
Dept: FAMILY MEDICINE CLINIC | Facility: CLINIC | Age: 27
End: 2022-02-07

## 2022-10-17 ENCOUNTER — OFFICE VISIT (OUTPATIENT)
Dept: FAMILY MEDICINE CLINIC | Facility: CLINIC | Age: 27
End: 2022-10-17
Payer: COMMERCIAL

## 2022-10-17 VITALS
SYSTOLIC BLOOD PRESSURE: 132 MMHG | OXYGEN SATURATION: 98 % | TEMPERATURE: 97.7 F | HEART RATE: 77 BPM | RESPIRATION RATE: 20 BRPM | HEIGHT: 67 IN | DIASTOLIC BLOOD PRESSURE: 88 MMHG | WEIGHT: 166.2 LBS | BODY MASS INDEX: 26.09 KG/M2

## 2022-10-17 DIAGNOSIS — Z11.59 NEED FOR HEPATITIS C SCREENING TEST: ICD-10-CM

## 2022-10-17 DIAGNOSIS — Z23 NEED FOR HPV VACCINATION: ICD-10-CM

## 2022-10-17 DIAGNOSIS — Z00.00 ANNUAL PHYSICAL EXAM: Primary | ICD-10-CM

## 2022-10-17 DIAGNOSIS — Z11.4 SCREENING FOR HIV (HUMAN IMMUNODEFICIENCY VIRUS): ICD-10-CM

## 2022-10-17 PROBLEM — Z78.9 USES BIRTH CONTROL: Status: RESOLVED | Noted: 2021-12-16 | Resolved: 2022-10-17

## 2022-10-17 PROCEDURE — 90651 9VHPV VACCINE 2/3 DOSE IM: CPT

## 2022-10-17 PROCEDURE — 90471 IMMUNIZATION ADMIN: CPT | Performed by: FAMILY MEDICINE

## 2022-10-17 PROCEDURE — 99395 PREV VISIT EST AGE 18-39: CPT | Performed by: FAMILY MEDICINE

## 2022-10-17 NOTE — PROGRESS NOTES
Assessment/Plan:    No problem-specific Assessment & Plan notes found for this encounter  Diagnoses and all orders for this visit:    Annual physical exam  -     TSH, 3rd generation with Free T4 reflex; Future  -     Lipid Panel with Direct LDL reflex; Future  -     Comprehensive metabolic panel; Future  -     CBC and differential; Future    Need for HPV vaccination  -     HPV VACCINE 9 VALENT IM    Screening for HIV (human immunodeficiency virus)  -     HIV 1/2 Antigen/Antibody (4th Generation) w Reflex [facility]; Future    Need for hepatitis C screening test  -     Hepatitis C antibody; Future        Patient is in NAD  Sent for annual labs  HPV vaccine given today  Subjective:      Patient ID: Keny Jean is a 24 y o  female  Patient presents to the clinic for an annual physical exam  Patient is moving to [facility]  and wanted a check up before leaving  Patient has no complaints at this time  Patient denies any chest pain, shortness of breath, cough, recent illness, headaches, fevers, abdominal pain, N/V/D/C  Patient does not take any medications  Review of Systems   Constitutional: Negative for chills and fever  HENT: Negative for ear pain, rhinorrhea and sore throat  Eyes: Negative for pain, discharge and visual disturbance  Respiratory: Negative for cough and shortness of breath  Cardiovascular: Negative for chest pain and palpitations  Gastrointestinal: Negative for abdominal pain, constipation, diarrhea, nausea and vomiting  Genitourinary: Negative for difficulty urinating, dysuria, hematuria and menstrual problem  Musculoskeletal: Negative for arthralgias and back pain  Skin: Negative for color change and rash  Neurological: Negative for dizziness, seizures, syncope, light-headedness and headaches  Psychiatric/Behavioral: Negative for agitation           Objective:      /88   Pulse 77   Temp 97 7 °F (36 5 °C)   Resp 20   Ht 5' 7" (1 702 m)   Wt 75 4 kg (166 lb 3 2 oz)   SpO2 98%   BMI 26 03 kg/m²          Physical Exam  Vitals reviewed  Constitutional:       General: She is not in acute distress  Appearance: She is not ill-appearing  HENT:      Head: Normocephalic and atraumatic  Right Ear: Tympanic membrane, ear canal and external ear normal       Left Ear: Tympanic membrane, ear canal and external ear normal       Nose: Nose normal  No rhinorrhea  Mouth/Throat:      Mouth: Mucous membranes are moist       Pharynx: No oropharyngeal exudate or posterior oropharyngeal erythema  Eyes:      General: No scleral icterus  Extraocular Movements: Extraocular movements intact  Conjunctiva/sclera: Conjunctivae normal       Pupils: Pupils are equal, round, and reactive to light  Cardiovascular:      Rate and Rhythm: Normal rate and regular rhythm  Pulses: Normal pulses  Heart sounds: Normal heart sounds  No murmur heard  Pulmonary:      Effort: Pulmonary effort is normal  No respiratory distress  Breath sounds: Normal breath sounds  No wheezing  Abdominal:      General: Bowel sounds are normal       Palpations: Abdomen is soft  Tenderness: There is no abdominal tenderness  There is no guarding  Musculoskeletal:         General: No swelling  Right lower leg: No edema  Left lower leg: No edema  Skin:     General: Skin is warm  Coloration: Skin is not jaundiced  Neurological:      Mental Status: She is alert and oriented to person, place, and time  Psychiatric:         Mood and Affect: Mood normal          Behavior: Behavior normal          Thought Content:  Thought content normal          Judgment: Judgment normal

## 2024-07-23 ENCOUNTER — PATIENT MESSAGE (OUTPATIENT)
Dept: FAMILY MEDICINE CLINIC | Facility: HOME HEALTHCARE | Age: 29
End: 2024-07-23

## (undated) DEVICE — BASKET SPECIMEN RETRIVAL 1.9FR 120CM

## (undated) DEVICE — GLOVE INDICATOR PI UNDERGLOVE SZ 7.5 BLUE

## (undated) DEVICE — GLOVE SRG BIOGEL 8

## (undated) DEVICE — UROCATCH BAG

## (undated) DEVICE — GLOVE SRG BIOGEL 8.5

## (undated) DEVICE — ENSEAL TISSUE SEALER G2 CURVED JAW FOR USE WITH G2 GENERATOR 5MM DIAMETER 25CM SHAFT LENGTH: Brand: ENSEAL

## (undated) DEVICE — BASKET STONE RETRIEVER HELICAL 3FR

## (undated) DEVICE — ENDOPATH XCEL UNIVERSAL TROCAR STABLILITY SLEEVES: Brand: ENDOPATH XCEL

## (undated) DEVICE — INTENDED FOR TISSUE SEPARATION, AND OTHER PROCEDURES THAT REQUIRE A SHARP SURGICAL BLADE TO PUNCTURE OR CUT.: Brand: BARD-PARKER SAFETY BLADES SIZE 11, STERILE

## (undated) DEVICE — 3M™ TEGADERM™ TRANSPARENT FILM DRESSING FRAME STYLE, 1627, 4 IN X 10 IN (10 CM X 25 CM), 20/CT 4CT/CASE: Brand: 3M™ TEGADERM™

## (undated) DEVICE — URO CATCHER BAG STERILE 0-UC32

## (undated) DEVICE — CHLORHEXIDINE 4PCT 4 OZ

## (undated) DEVICE — BAG URINE DRAINAGE 2000ML ANTI RFLX LF

## (undated) DEVICE — SUT VICRYL 0 UR-6 27 IN J603H

## (undated) DEVICE — Device: Brand: TISSUE RETRIEVAL SYSTEM

## (undated) DEVICE — TUBING SUCTION 5MM X 12 FT

## (undated) DEVICE — LASER HOLMIUM FIBER 365 MIC

## (undated) DEVICE — SHEATH URETERAL ACCESS 10/12FR 35CM PROXIS

## (undated) DEVICE — LUBRICANT SURGILUBE TUBE 4 OZ  FLIP TOP

## (undated) DEVICE — INFUSER BAG 3000ML

## (undated) DEVICE — GLOVE SRG BIOGEL 7.5

## (undated) DEVICE — SPECIMEN CONTAINER STERILE PEEL PACK

## (undated) DEVICE — ENDOPATH XCEL BLADELESS TROCARS WITH STABILITY SLEEVES: Brand: ENDOPATH XCEL

## (undated) DEVICE — TRAY FOLEY 16FR URIMETER SILICONE SURESTEP

## (undated) DEVICE — NEEDLE 25GA X 1 IN SAFETY GLIDE

## (undated) DEVICE — URETERAL DUAL LUMEN CATH

## (undated) DEVICE — SUT MONOCRYL 4-0 PS-2 18 IN Y496G

## (undated) DEVICE — PACK TUR

## (undated) DEVICE — INSUFFLATION TUBING PRIMFLO

## (undated) DEVICE — ADHESIVE SKN CLSR HISTOACRYL FLEX 0.5ML LF

## (undated) DEVICE — SYRINGE 10ML LL

## (undated) DEVICE — ANTI-FOG SOLUTION WITH FOAM PAD: Brand: DEVON

## (undated) DEVICE — 3000CC GUARDIAN II: Brand: GUARDIAN

## (undated) DEVICE — GUIDEWIRE STRGHT TIP 0.035 IN  SOLO PLUS

## (undated) DEVICE — TRANSPOSAL ULTRAFLEX DUO/QUAD ULTRA CART MANIFOLD

## (undated) DEVICE — SCD SEQUENTIAL COMPRESSION COMFORT SLEEVE MEDIUM KNEE LENGTH: Brand: KENDALL SCD

## (undated) DEVICE — IRRIG ENDO FLO TUBING

## (undated) DEVICE — CHLORAPREP HI-LITE 26ML ORANGE

## (undated) DEVICE — CIRCON PORT SEAL

## (undated) DEVICE — GLOVE INDICATOR PI UNDERGLOVE SZ 8.5 BLUE

## (undated) DEVICE — PACK PBDS STERILE LAP LITHOTOMY RF